# Patient Record
Sex: MALE | Race: WHITE | NOT HISPANIC OR LATINO | Employment: OTHER | URBAN - METROPOLITAN AREA
[De-identification: names, ages, dates, MRNs, and addresses within clinical notes are randomized per-mention and may not be internally consistent; named-entity substitution may affect disease eponyms.]

---

## 2020-09-22 LAB — NONINV COLON CA DNA+OCC BLD SCRN STL QL: POSITIVE

## 2022-08-16 DIAGNOSIS — C68.9 UROTHELIAL CARCINOMA: Primary | ICD-10-CM

## 2022-08-23 ENCOUNTER — OFFICE VISIT (OUTPATIENT)
Dept: HEMATOLOGY/ONCOLOGY | Facility: CLINIC | Age: 77
End: 2022-08-23
Payer: MEDICARE

## 2022-08-23 VITALS
HEART RATE: 75 BPM | HEIGHT: 69 IN | SYSTOLIC BLOOD PRESSURE: 138 MMHG | BODY MASS INDEX: 28.34 KG/M2 | OXYGEN SATURATION: 98 % | WEIGHT: 191.31 LBS | RESPIRATION RATE: 18 BRPM | DIASTOLIC BLOOD PRESSURE: 63 MMHG

## 2022-08-23 DIAGNOSIS — C68.9 UROTHELIAL CARCINOMA: ICD-10-CM

## 2022-08-23 PROCEDURE — 99205 PR OFFICE/OUTPT VISIT, NEW, LEVL V, 60-74 MIN: ICD-10-PCS | Mod: ,,, | Performed by: STUDENT IN AN ORGANIZED HEALTH CARE EDUCATION/TRAINING PROGRAM

## 2022-08-23 PROCEDURE — 99205 OFFICE O/P NEW HI 60 MIN: CPT | Mod: ,,, | Performed by: STUDENT IN AN ORGANIZED HEALTH CARE EDUCATION/TRAINING PROGRAM

## 2022-08-23 RX ORDER — DICLOFENAC SODIUM 10 MG/G
GEL TOPICAL
COMMUNITY
Start: 2022-08-02 | End: 2023-12-26 | Stop reason: SDUPTHER

## 2022-08-23 RX ORDER — ATORVASTATIN CALCIUM 20 MG/1
20 TABLET, FILM COATED ORAL DAILY
COMMUNITY
Start: 2022-08-16 | End: 2023-08-17 | Stop reason: SDUPTHER

## 2022-08-23 RX ORDER — ASPIRIN 81 MG/1
81 TABLET ORAL DAILY
COMMUNITY

## 2022-08-23 RX ORDER — TAMSULOSIN HYDROCHLORIDE 0.4 MG/1
1 CAPSULE ORAL DAILY
COMMUNITY
Start: 2022-07-06 | End: 2023-07-06 | Stop reason: SDUPTHER

## 2022-08-23 RX ORDER — MULTIVITAMIN
1 TABLET ORAL DAILY
COMMUNITY

## 2022-08-23 RX ORDER — BENAZEPRIL HYDROCHLORIDE 40 MG/1
40 TABLET ORAL DAILY
COMMUNITY
Start: 2022-07-06 | End: 2023-07-06 | Stop reason: SDUPTHER

## 2022-08-23 RX ORDER — FINASTERIDE 5 MG/1
5 TABLET, FILM COATED ORAL DAILY
COMMUNITY
Start: 2022-08-01

## 2022-08-23 NOTE — PROGRESS NOTES
Referring provider Dr. Hensley  Reason for consult:  Urothelial carcinoma    HPI  Patient is a 76-year-old with history of hypertension and BPH who presented to the ER in May 2022 with symptoms of hematuria.  A CT at that point revealed an obstructing mass in the right renal pelvis for which he was seen by Dr. Hensley and underwent a diagnostic ureteroscopy, pathology from which revealed urothelial carcinoma.  Patient underwent a right ureteronephrectomy on 23rd July 2022.  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  Patient presented to clinic to establish care to discuss further disease management.      Today 08/23/2022, patient denies any acute concerns.  He denies any further episodes of hematuria.  He reports healing well from surgery without any significant side effects.  He reports a fair appetite denies any weight loss.  He denies any new lumps or bumps, fevers, chills, drenching night sweats.      Patient is a current smoker, 25 pack year smoking history, denies alcohol or recreational drug use.  He currently works part-time, [employer].  Denies any family history of malignancy.  Lives with his wife.  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  Margins free of tumor, lymphovascular invasion not seen.  No regional lymph nodes submitted.  PT3 pNX pGlow-grade    Imaging   May 2022:  CT abdomen pelvis with contrast:  The filling defect in the right renal pelvis is most suggestive of a mass with neoplasm a concern.  This lesion also produces relatively delay in contrast opacification of the right kidney indicating partial urinary obstruction.    Past Medical History:    Diagnosis Date    Bladder cancer     Enlarged prostate     Hyperlipidemia     Hypertension     Urothelial carcinoma of kidney, right        Past Surgical History:   Procedure Laterality Date    KNEE SURGERY      LAPAROSCOPIC ROBOT-ASSISTED SURGICAL REMOVAL OF KIDNEY USING DA JAZMIN XI  07/20/2022    SHOULDER SURGERY         Family History   Problem Relation Age of Onset    Heart disease Mother     Heart disease Father     Heart disease Sister        Social History     Socioeconomic History    Marital status:    Tobacco Use    Smoking status: Smoker, Current Status Unknown     Packs/day: 0.50     Types: Cigarettes    Smokeless tobacco: Never Used   Substance and Sexual Activity    Alcohol use: Not Currently       Current Outpatient Medications   Medication Sig Dispense Refill    aspirin (ECOTRIN) 81 MG EC tablet Take 81 mg by mouth once daily.      atorvastatin (LIPITOR) 20 MG tablet Take 20 mg by mouth once daily.      benazepriL (LOTENSIN) 40 MG tablet Take 40 mg by mouth once daily.      diclofenac sodium (VOLTAREN) 1 % Gel Apply topically.      finasteride (PROSCAR) 5 mg tablet Take 5 mg by mouth once daily.      multivitamin (THERAGRAN) per tablet Take 1 tablet by mouth once daily.      tamsulosin (FLOMAX) 0.4 mg Cap Take 1 capsule by mouth once daily.       No current facility-administered medications for this visit.       Review of patient's allergies indicates:  No Known Allergies    Review of systems  CONSTITUTIONAL: no fevers, no chills, no weight loss, no fatigue, no weakness  HEMATOLOGIC: no abnormal bleeding, no abnormal bruising, no drenching night sweats  ONCOLOGIC: no new masses or lumps  HEENT: no vision loss, no tinnitus or hearing loss, no nose bleeding, no dysphagia, no odynophagia  CVS: no chest pain, no palpitations, no dyspnea on exertion  RESP: no shortness of breath, no hemoptysis, no cough  GI: no nausea, no vomiting, no diarrhea, no constipation, no melena, no  hematochezia, no hematemesis, no abdominal pain, no increase in abdominal girth  : no dysuria, no hematuria, no hesitancy, no scrotal swelling, no discharge  INTEGUMENT: no rashes, no abnormal bruising, no nail pitting, no hyperpigmentation  NEURO: no falls, no memory loss, no paresthesias or dysesthesias, no urofecal incontinence or retention, no loss of strength on any extremity  MSK: no back pain, no new joint pain, no joint swelling  PSYCH: no suicidal or homicidal ideation, no depression, no insomnia, no anhedonia  ENDOCRINE: no heat or cold intolerance, no polyuria, no polydipsia        Physical exam   Vitals:    08/23/22 1313   BP: 138/63   Pulse: 75   Resp: 18     Physical Exam:  ECOG PS 0  GA: AAOx3, NAD  HEENT: NCAT, PERRLA, EOMI, good dentition, moist oral mucous membranes  LYMPH: no cervical, axillary or supraclavicular adenopathy  CVS: s1s2 RRR, no M/R/G  RESP: CTA b/l, no crackles, no wheezes or rhonchi  ABD: soft, NT, ND, BS+, no hepatosplenomegaly  EXT: no deformities, no pedal edema  SKIN: no rashes, warm and dry  NEURO: normal mentation, strength 5/5 on all 4 extremities, no sensory deficits    Assessment and plan       # Urothelial carcinoma, right, upper ureter, low-grade, pT3 pNxMX, at least stage III  Negative margins, negative for lymphovascular invasion.    Of note patient had a diagnosis of non-muscle invasive bladder cancer in the past treated with resection followed by intravesical therapy.  Discussed with patient the diagnosis, staging and treatment recommendations including adjuvant therapy in the setting of T3 disease.    Patient is a relatively fit, currently works part-time.  He does have mild hearing loss.  Denies any symptoms of neuropathy.    Discussed the recommendations to undergo adjuvant platinum-based chemotherapy given T3 disease after completion of staging workup.     Patient is hesitant to consider chemotherapy given his experience with his family members going through  it.    Discussed the plan to obtain scans to complete staging workup and discuss further treatment options at his next visit.    Will plan to follow-up in clinic in 2 weeks with a repeat CT chest abdomen pelvis with IV contrast, CBC and CMP to discuss further.      Plan   # CT chest abdomen pelvis with# IV contrast   # CBC and CMP in 2 weeks   # follow-up in 2 weeks to discuss above workup and further recommendations regarding adjuvant chemotherapy in the setting of T3 disease.      A total of  60 minutes were spent in review of records, interpretation of test, coordination of care, discussion and counseling with the patient.        Portions of the record may have been created with voice recognition software. Occasional wrong-word or sound-a-like substitutions may have occurred due to the inherent limitations of voice recognition software. Read the chart carefully and recognize, using context, where substitutions have occurred.

## 2022-08-25 ENCOUNTER — TELEPHONE (OUTPATIENT)
Dept: HEMATOLOGY/ONCOLOGY | Facility: CLINIC | Age: 77
End: 2022-08-25
Payer: MEDICARE

## 2022-08-25 NOTE — TELEPHONE ENCOUNTER
----- Message from Natacha Clements sent at 8/24/2022  2:15 PM CDT -----  Regarding: RE: Please Scheduled  CT CAP scheduled 09/06/22 @ [facility] [place] [facility] w/ 12:30 arrival (pt requested afternoon appt)  ##NPO 6 hrs prior &  prep-kit day before##    Appt confirmed w/ patient  ----- Message -----  From: Natalya Torres  Sent: 8/23/2022   1:52 PM CDT  To: Natacha Clements  Subject: Please Scheduled                                 Please schedule CT CAP w/ contrast @ [facility] [place] before RTC on 09/09/22  Please see orders    Thanks

## 2022-09-06 NOTE — PROGRESS NOTES
Referring provider Dr. Hensley  Reason for consult:  Urothelial carcinoma    HPI  Patient is a 76-year-old with history of hypertension and BPH who presented to the ER in May 2022 with symptoms of hematuria.  A CT at that point revealed an obstructing mass in the right renal pelvis for which he was seen by Dr. eHnsley and underwent a diagnostic ureteroscopy, pathology from which revealed urothelial carcinoma.  Patient underwent a right ureteronephrectomy on 23rd July 2022.  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  Patient presented to clinic to establish care to discuss further disease management.      Today 09/7/22, patient denies any acute concerns today.  He denies any blood in his urine or difficulty with urination.  He denies any symptoms of pain, nausea, vomiting, decreased appetite or weight loss.  He reports increasing his smoking frequency given his anxiety related to his recent scans.    Patient is a current smoker, 25 pack year smoking history, denies alcohol or recreational drug use.  He currently works part-time, [employer].  Denies any family history of malignancy.  Lives with his wife.  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  Margins free of tumor, lymphovascular invasion not seen.  No regional lymph nodes submitted.  PT3 pNX pGlow-grade    Imaging   May 2022:  CT abdomen pelvis with contrast:  The filling defect in the right renal pelvis is most suggestive of a mass with neoplasm a concern.  This lesion also produces relatively delay in contrast opacification of the right kidney indicating partial urinary obstruction.      09/06/2022 CT chest abdomen  pelvis with IV contrast:  No acute pulmonary disease, adenopathy or suspicious pulmonary nodules identified.  Is suspected cyst is visible in the right thyroid lobe, evaluation with dedicated ultrasound to be considered.  Status post right nephrectomy and apparent surgery involving the right lateral wall of the urinary bladder, no gross mucosal lesions visible in the bladder although there is relative wall thickening identified inferiorly and posteriorly.      Laboratory   09/02/2022:  Creatinine 1.44, albumin 4.0, alkaline phosphatase 98, total bili 0.7, AST 18, ALT 18, WBC count 9.06, hemoglobin 14.5, platelet count 173.  ANC 5.70.    Past Medical History:   Diagnosis Date    Bladder cancer     Enlarged prostate     Hyperlipidemia     Hypertension     Urothelial carcinoma of kidney, right        Past Surgical History:   Procedure Laterality Date    KNEE SURGERY      LAPAROSCOPIC ROBOT-ASSISTED SURGICAL REMOVAL OF KIDNEY USING DA JAZMIN XI  07/20/2022    SHOULDER SURGERY         Family History   Problem Relation Age of Onset    Heart disease Mother     Heart disease Father     Heart disease Sister        Social History     Socioeconomic History    Marital status:    Tobacco Use    Smoking status: Smoker, Current Status Unknown     Packs/day: 0.50     Types: Cigarettes    Smokeless tobacco: Never   Substance and Sexual Activity    Alcohol use: Not Currently       Current Outpatient Medications   Medication Sig Dispense Refill    aspirin (ECOTRIN) 81 MG EC tablet Take 81 mg by mouth once daily.      atorvastatin (LIPITOR) 20 MG tablet Take 20 mg by mouth once daily.      benazepriL (LOTENSIN) 40 MG tablet Take 40 mg by mouth once daily.      diclofenac sodium (VOLTAREN) 1 % Gel Apply topically.      finasteride (PROSCAR) 5 mg tablet Take 5 mg by mouth once daily.      multivitamin (THERAGRAN) per tablet Take 1 tablet by mouth once daily.      tamsulosin (FLOMAX) 0.4 mg Cap Take 1 capsule by mouth once daily.        No current facility-administered medications for this visit.       Review of patient's allergies indicates:  No Known Allergies    Review of systems  CONSTITUTIONAL: no fevers, no chills, no weight loss, no fatigue, no weakness  HEMATOLOGIC: no abnormal bleeding, no abnormal bruising, no drenching night sweats  ONCOLOGIC: no new masses or lumps  HEENT: no vision loss, no tinnitus or hearing loss, no nose bleeding, no dysphagia, no odynophagia  CVS: no chest pain, no palpitations, no dyspnea on exertion  RESP: no shortness of breath, no hemoptysis, no cough  GI: no nausea, no vomiting, no diarrhea, no constipation, no melena, no hematochezia, no hematemesis, no abdominal pain, no increase in abdominal girth  : no dysuria, no hematuria, no hesitancy, no scrotal swelling, no discharge  INTEGUMENT: no rashes, no abnormal bruising, no nail pitting, no hyperpigmentation  NEURO: no falls, no memory loss, no paresthesias or dysesthesias, no urofecal incontinence or retention, no loss of strength on any extremity  MSK: no back pain, no new joint pain, no joint swelling  PSYCH: no suicidal or homicidal ideation, no depression, no insomnia, no anhedonia  ENDOCRINE: no heat or cold intolerance, no polyuria, no polydipsia        Physical exam   Vitals:    09/07/22 1053   BP: 115/65   Pulse: 70   Resp: 18   Temp: 98.1 °F (36.7 °C)       Physical Exam:  ECOG PS 0  GA: AAOx3, NAD  HEENT: NCAT, PERRLA, EOMI, good dentition, moist oral mucous membranes  LYMPH: no cervical, axillary or supraclavicular adenopathy  CVS: s1s2 RRR, no M/R/G  RESP: CTA b/l, no crackles, no wheezes or rhonchi  ABD: soft, NT, ND, BS+, no hepatosplenomegaly, well-healed incisions from prior laparoscopic surgery.  EXT: no deformities, no pedal edema  SKIN: no rashes, warm and dry  NEURO: normal mentation, strength 5/5 on all 4 extremities, no sensory deficits    Assessment and plan       # Urothelial carcinoma, right, upper ureter, low-grade, pT3 pNxMX,  at least stage III  Negative margins, negative for lymphovascular invasion.    Of note patient had a diagnosis of non-muscle invasive bladder cancer in the past treated with resection followed by intravesical therapy.  Discussed with patient the diagnosis, staging and treatment recommendations including adjuvant therapy in the setting of T3 disease.    Patient is a relatively fit, currently works part-time.  He does have mild hearing loss.  Denies any symptoms of neuropathy.    Discussed the recommendations to undergo adjuvant platinum-based chemotherapy given T3 disease after completion of staging workup.     Patient is hesitant to consider chemotherapy given his experience with his family members going through it.    Noted repeat CT chest abdomen pelvis IV contrast on 09/06/2022 without evidence of metastatic disease   Recommend adjuvant chemotherapy with gem cis.  Discussed the category 2B recommendation for nivolumab in adjuvant setting.  Patient would like to think about his options prior to making a decision.  He will be calling our office and bring his no if he is decided to proceed with adjuvant treatment.  And the choice of treatment between cisplatin/gemcitabine versus nivolumab.    Plan   # patient will be calling our clinic after discussing further with his family regarding his decision to proceed with adjuvant treatments   # plan to follow-up in clinic in 1 month as a backup with repeat labs.      A total of  40 minutes were spent in review of records, interpretation of test, coordination of care, discussion and counseling with the patient.        Addendum   09/12/2022  3:55 PM  Patient called office to let us know that he decided to go forth with adjuvant immunotherapy with 1 year of nivolumab instead of the recommendations for platinum based chemotherapy.    Orders placed, for insurance authorization.  Will schedule him for chemo education prior to starting treatment.  Plan to follow-up in clinic with  cycle 2 day 1 to assess treatment tolerance.       Portions of the record may have been created with voice recognition software. Occasional wrong-word or sound-a-like substitutions may have occurred due to the inherent limitations of voice recognition software. Read the chart carefully and recognize, using context, where substitutions have occurred.

## 2022-09-07 ENCOUNTER — OFFICE VISIT (OUTPATIENT)
Dept: HEMATOLOGY/ONCOLOGY | Facility: CLINIC | Age: 77
End: 2022-09-07
Payer: MEDICARE

## 2022-09-07 VITALS
OXYGEN SATURATION: 97 % | RESPIRATION RATE: 18 BRPM | DIASTOLIC BLOOD PRESSURE: 65 MMHG | TEMPERATURE: 98 F | SYSTOLIC BLOOD PRESSURE: 115 MMHG | WEIGHT: 192.81 LBS | BODY MASS INDEX: 28.56 KG/M2 | HEIGHT: 69 IN | HEART RATE: 70 BPM

## 2022-09-07 DIAGNOSIS — C68.9 UROTHELIAL CARCINOMA: Primary | ICD-10-CM

## 2022-09-07 PROCEDURE — 99215 PR OFFICE/OUTPT VISIT, EST, LEVL V, 40-54 MIN: ICD-10-PCS | Mod: ,,, | Performed by: STUDENT IN AN ORGANIZED HEALTH CARE EDUCATION/TRAINING PROGRAM

## 2022-09-07 PROCEDURE — 99215 OFFICE O/P EST HI 40 MIN: CPT | Mod: ,,, | Performed by: STUDENT IN AN ORGANIZED HEALTH CARE EDUCATION/TRAINING PROGRAM

## 2022-09-15 ENCOUNTER — OFFICE VISIT (OUTPATIENT)
Dept: HEMATOLOGY/ONCOLOGY | Facility: CLINIC | Age: 77
End: 2022-09-15
Payer: MEDICARE

## 2022-09-15 VITALS
DIASTOLIC BLOOD PRESSURE: 74 MMHG | SYSTOLIC BLOOD PRESSURE: 138 MMHG | TEMPERATURE: 98 F | HEIGHT: 69 IN | OXYGEN SATURATION: 99 % | BODY MASS INDEX: 28.81 KG/M2 | RESPIRATION RATE: 18 BRPM | WEIGHT: 194.5 LBS | HEART RATE: 61 BPM

## 2022-09-15 DIAGNOSIS — Z51.12 ENCOUNTER FOR ANTINEOPLASTIC IMMUNOTHERAPY: ICD-10-CM

## 2022-09-15 DIAGNOSIS — C68.9 UROTHELIAL CARCINOMA: Primary | ICD-10-CM

## 2022-09-15 PROCEDURE — 99214 PR OFFICE/OUTPT VISIT, EST, LEVL IV, 30-39 MIN: ICD-10-PCS | Mod: ,,, | Performed by: NURSE PRACTITIONER

## 2022-09-15 PROCEDURE — 99214 OFFICE O/P EST MOD 30 MIN: CPT | Mod: ,,, | Performed by: NURSE PRACTITIONER

## 2022-09-15 RX ORDER — ONDANSETRON HYDROCHLORIDE 8 MG/1
8 TABLET, FILM COATED ORAL EVERY 8 HOURS PRN
Qty: 30 TABLET | Refills: 2 | Status: SHIPPED | OUTPATIENT
Start: 2022-09-15 | End: 2022-11-17

## 2022-09-15 NOTE — PROGRESS NOTES
"Referring provider Dr. Hensley  Reason for consult:  Urothelial carcinoma    HPI  Patient is a 76-year-old with history of hypertension and BPH who presented to the ER in May 2022 with symptoms of hematuria.  A CT at that point revealed an obstructing mass in the right renal pelvis for which he was seen by Dr. Hensley and underwent a diagnostic ureteroscopy, pathology from which revealed urothelial carcinoma.  Patient underwent a right ureteronephrectomy on 23rd July 2022.  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  Patient presented to clinic to establish care to discuss further disease management.      Today 09/15/2022: Patient presents to clinic today for chemotherapy education. Treatment of Urothelial carcinoma, right, upper ureter, low-grade, pT3 pNxMX, at least stage III. Adjuvant immunotherapy with 1 year of nivolumab.   He is feeling "very good". He has no complaints, denies any changes from last visit.     Patient is a current smoker, 25 pack year smoking history, denies alcohol or recreational drug use.  He currently works part-time, [employer].  Denies any family history of malignancy.  Lives with his wife.  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  Margins free of tumor, lymphovascular invasion not seen.  No regional lymph nodes submitted.  PT3 pNX pGlow-grade    Imaging   May 2022:  CT abdomen pelvis with contrast:  The filling defect in the right renal pelvis is most suggestive of a mass with neoplasm a concern.  This lesion also produces relatively delay in contrast opacification of the right kidney indicating partial urinary obstruction.    09/06/2022 CT " chest abdomen pelvis with IV contrast:  No acute pulmonary disease, adenopathy or suspicious pulmonary nodules identified.  Is suspected cyst is visible in the right thyroid lobe, evaluation with dedicated ultrasound to be considered.  Status post right nephrectomy and apparent surgery involving the right lateral wall of the urinary bladder, no gross mucosal lesions visible in the bladder although there is relative wall thickening identified inferiorly and posteriorly.    Laboratory   09/02/2022:  Creatinine 1.44, albumin 4.0, alkaline phosphatase 98, total bili 0.7, AST 18, ALT 18, WBC count 9.06, hemoglobin 14.5, platelet count 173.  ANC 5.70.    Past Medical History:   Diagnosis Date    Bladder cancer     Enlarged prostate     Hyperlipidemia     Hypertension     Urothelial carcinoma of kidney, right        Past Surgical History:   Procedure Laterality Date    KNEE SURGERY      LAPAROSCOPIC ROBOT-ASSISTED SURGICAL REMOVAL OF KIDNEY USING DA JAZMIN XI  07/20/2022    SHOULDER SURGERY         Family History   Problem Relation Age of Onset    Heart disease Mother     Heart disease Father     Heart disease Sister        Social History     Socioeconomic History    Marital status:    Tobacco Use    Smoking status: Smoker, Current Status Unknown     Packs/day: 0.50     Types: Cigarettes    Smokeless tobacco: Never   Substance and Sexual Activity    Alcohol use: Not Currently       Current Outpatient Medications   Medication Sig Dispense Refill    aspirin (ECOTRIN) 81 MG EC tablet Take 81 mg by mouth once daily.      atorvastatin (LIPITOR) 20 MG tablet Take 20 mg by mouth once daily.      benazepriL (LOTENSIN) 40 MG tablet Take 40 mg by mouth once daily.      diclofenac sodium (VOLTAREN) 1 % Gel Apply topically.      finasteride (PROSCAR) 5 mg tablet Take 5 mg by mouth once daily.      multivitamin (THERAGRAN) per tablet Take 1 tablet by mouth once daily.      tamsulosin (FLOMAX) 0.4 mg Cap Take 1 capsule by mouth  "once daily.       No current facility-administered medications for this visit.       Review of patient's allergies indicates:  No Known Allergies    Review of systems  CONSTITUTIONAL: no fevers, no chills, no weight loss, no fatigue, no weakness  HEMATOLOGIC: no abnormal bleeding, no abnormal bruising, no drenching night sweats  ONCOLOGIC: no new masses or lumps  HEENT: no vision loss, no tinnitus or hearing loss, no nose bleeding, no dysphagia, no odynophagia  CVS: no chest pain, no palpitations, no dyspnea on exertion  RESP: + intermittent smoker's cough. Very little SOB w/ activity. Improves with rest. Coung/sob not new and unchanged. Cough occurs 1-2 times per day. Clear phlegm. no hemoptysis.   GI: no nausea, no vomiting, no diarrhea, no constipation, no melena, no hematochezia, no hematemesis, no abdominal pain, no increase in abdominal girth  : no dysuria, no hematuria, no hesitancy, no scrotal swelling, no discharge  INTEGUMENT: no rashes, no abnormal bruising, no nail pitting, no hyperpigmentation  NEURO: no falls, no memory loss, no paresthesias or dysesthesias, no urofecal incontinence or retention, no loss of strength on any extremity  MSK: Chronic low back pain, since early 30s. Hand pain, intermittent, fingers(mainly pinkys and right index(due to history of Dupertyn's contracture), no new joint pain, no joint swelling  PSYCH: no suicidal or homicidal ideation, no depression, no insomnia, no anhedonia  ENDOCRINE: no heat or cold intolerance, no polyuria, no polydipsia    Physical Exam:  Vitals: Blood pressure 138/74, pulse 61, temperature 98 °F (36.7 °C), temperature source Oral, resp. rate 18, height 5' 9" (1.753 m), weight 88.2 kg (194 lb 8 oz), SpO2 99 %.   ECOG PS 0  GA: AAOx3, NAD  HEENT: Slight hearing deficit. Sclera anicteric.   RESP: No labored breathing.   EXT: Mild flexure contractures bilateral fifth digits.   SKIN: Couple faded ecchymotic lesions, right forearm.   NEURO: normal mentation. " Normal gait.     Assessment and plan:   # Urothelial carcinoma, right, upper ureter, low-grade, pT3 pNxMX, at least stage III  Negative margins, negative for lymphovascular invasion.    Of note patient had a diagnosis of non-muscle invasive bladder cancer in the past treated with resection followed by intravesical therapy.  Dr. Montana previously discussed with patient the diagnosis, staging and treatment recommendations including adjuvant therapy in the setting of T3 disease.    Patient is a relatively fit, currently works part-time.  He does have mild hearing loss.  Denies any symptoms of neuropathy.    Discussed the recommendations to undergo adjuvant platinum-based chemotherapy given T3 disease after completion of staging workup.     Patient is hesitant to consider chemotherapy given his experience with his family members going through it.    Noted repeat CT chest abdomen pelvis IV contrast on 09/06/2022 without evidence of metastatic disease   Recommend adjuvant chemotherapy with gem cis.  Discussed the category 2B recommendation for nivolumab in adjuvant setting.      2. Education for Immunotherapy. Nivolumab.   Discussed Goals of Therapy-Yes.    Discussed Immunotherapy Drug-Yes.    Discussed Immunotherapy Regimen-Yes.    Discussed potential short - and long-term adverse Effects-Yes.  Special emphasis on Immune mediated reactions.   Discussed S/S to call MD/NP-Yes.    Written material for Nivolumab given to patient.   Ondansetron e-sent to patient's pharmacy.   Patient was given time to ask questions and express concerns. Questions answered-Yes.  Concerns addressed - Yes.   Verbalized Understanding-Yes.    Counseled::  Patient, Regarding diagnosis, Regarding treatment, Regarding medications, Regarding diet, Regarding activity, Verbalized understanding.       Total time spent on immunotherapy education and medical discussion: 30 minutes    Plan   # patient to return to clinic on 9/22/2022 with plans on starting  with Cycle # 1 Nivolumab.   # Labs to be done on 9/21/2022. CBC, CMP, TSH, Cortisol and magnesium.   # Next appointment will be on 10/20/2022 with Dr. Montana prior to receiving cycle # 2, to assess treatment tolerance. Labs prior.   # He is to call in the interim with any questions or concerns.       Tahira Strong, JOHNC

## 2022-09-22 DIAGNOSIS — E87.5 HYPERKALEMIA: Primary | ICD-10-CM

## 2022-09-22 RX ORDER — HEPARIN 100 UNIT/ML
500 SYRINGE INTRAVENOUS
Status: CANCELLED | OUTPATIENT
Start: 2022-09-22

## 2022-09-22 RX ORDER — SODIUM CHLORIDE 0.9 % (FLUSH) 0.9 %
10 SYRINGE (ML) INJECTION
Status: CANCELLED | OUTPATIENT
Start: 2022-09-22

## 2022-10-20 ENCOUNTER — OFFICE VISIT (OUTPATIENT)
Dept: HEMATOLOGY/ONCOLOGY | Facility: CLINIC | Age: 77
End: 2022-10-20
Payer: MEDICARE

## 2022-10-20 VITALS
SYSTOLIC BLOOD PRESSURE: 147 MMHG | DIASTOLIC BLOOD PRESSURE: 77 MMHG | RESPIRATION RATE: 18 BRPM | OXYGEN SATURATION: 98 % | HEART RATE: 79 BPM | BODY MASS INDEX: 28.53 KG/M2 | TEMPERATURE: 98 F | WEIGHT: 193.19 LBS

## 2022-10-20 DIAGNOSIS — C68.9 UROTHELIAL CARCINOMA: Primary | ICD-10-CM

## 2022-10-20 DIAGNOSIS — Z51.11 ENCOUNTER FOR ANTINEOPLASTIC CHEMOTHERAPY: ICD-10-CM

## 2022-10-20 PROCEDURE — 99215 OFFICE O/P EST HI 40 MIN: CPT | Mod: ,,, | Performed by: STUDENT IN AN ORGANIZED HEALTH CARE EDUCATION/TRAINING PROGRAM

## 2022-10-20 PROCEDURE — 99215 PR OFFICE/OUTPT VISIT, EST, LEVL V, 40-54 MIN: ICD-10-PCS | Mod: ,,, | Performed by: STUDENT IN AN ORGANIZED HEALTH CARE EDUCATION/TRAINING PROGRAM

## 2022-10-20 RX ORDER — SODIUM CHLORIDE 0.9 % (FLUSH) 0.9 %
10 SYRINGE (ML) INJECTION
Status: CANCELLED | OUTPATIENT
Start: 2022-10-20

## 2022-10-20 RX ORDER — HEPARIN 100 UNIT/ML
500 SYRINGE INTRAVENOUS
Status: CANCELLED | OUTPATIENT
Start: 2022-10-20

## 2022-10-20 NOTE — PROGRESS NOTES
Referring provider Dr. Hensley  Reason for consult:  Urothelial carcinoma      Current treatment   09/21/2022-current: Nivolumab    HPI  Patient is a 76-year-old with history of hypertension and BPH who presented to the ER in May 2022 with symptoms of hematuria.  A CT at that point revealed an obstructing mass in the right renal pelvis for which he was seen by Dr. Hensley and underwent a diagnostic ureteroscopy, pathology from which revealed urothelial carcinoma.  Patient underwent a right ureteronephrectomy on 23rd July 2022.  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  Patient presented to clinic to establish care to discuss further disease management.      Today, 10/20/2022, patient denies any acute concerns today.  He reports mild fatigue and intermittent nausea.  He reports a good appetite denies any weight loss.  He denies any new foci of pain.  Denies any hematuria or urinary urgency, increased frequency or burning.  Reports tolerating his last cycle of treatment fairly well    Patient is a current smoker, 25 pack year smoking history, denies alcohol or recreational drug use.  He currently works part-time, [employer].  Denies any family history of malignancy.  Lives with his wife.  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  Margins free of tumor, lymphovascular invasion not seen.  No regional lymph nodes submitted.  PT3 pNX pGlow-grade    Imaging   May 2022:  CT abdomen pelvis with contrast:  The filling defect in the right renal pelvis is most suggestive of a mass with neoplasm a concern.  This lesion also produces relatively delay in contrast opacification of the  right kidney indicating partial urinary obstruction.      09/06/2022 CT chest abdomen pelvis with IV contrast:  No acute pulmonary disease, adenopathy or suspicious pulmonary nodules identified.  Is suspected cyst is visible in the right thyroid lobe, evaluation with dedicated ultrasound to be considered.  Status post right nephrectomy and apparent surgery involving the right lateral wall of the urinary bladder, no gross mucosal lesions visible in the bladder although there is relative wall thickening identified inferiorly and posteriorly.      Laboratory   10/19/2022:  Creatinine 1.64, albumin 3.8, alkaline phosphatase 110, LFTs within normal limits, TSH 0.039, cortisol 4.9, WBC count 7.6, hemoglobin 13.6, .3, platelet count 191 000, ANC 4.75.    09/02/2022:  Creatinine 1.44, albumin 4.0, alkaline phosphatase 98, total bili 0.7, AST 18, ALT 18, WBC count 9.06, hemoglobin 14.5, platelet count 173.  ANC 5.70.    Past Medical History:   Diagnosis Date    Bladder cancer     Enlarged prostate     Hyperlipidemia     Hypertension     Urothelial carcinoma of kidney, right        Past Surgical History:   Procedure Laterality Date    KNEE SURGERY      LAPAROSCOPIC ROBOT-ASSISTED SURGICAL REMOVAL OF KIDNEY USING DA JAZMIN XI  07/20/2022    SHOULDER SURGERY         Family History   Problem Relation Age of Onset    Heart disease Mother     Heart disease Father     Heart disease Sister        Social History     Socioeconomic History    Marital status:    Tobacco Use    Smoking status: Smoker, Current Status Unknown     Packs/day: 0.50     Types: Cigarettes    Smokeless tobacco: Never   Substance and Sexual Activity    Alcohol use: Not Currently    Drug use: Never       Current Outpatient Medications   Medication Sig Dispense Refill    aspirin (ECOTRIN) 81 MG EC tablet Take 81 mg by mouth once daily.      atorvastatin (LIPITOR) 20 MG tablet Take 20 mg by mouth once daily.      benazepriL (LOTENSIN) 40 MG tablet Take  40 mg by mouth once daily.      diclofenac sodium (VOLTAREN) 1 % Gel Apply topically.      finasteride (PROSCAR) 5 mg tablet Take 5 mg by mouth once daily.      multivitamin (THERAGRAN) per tablet Take 1 tablet by mouth once daily.      ondansetron (ZOFRAN) 8 MG tablet Take 1 tablet (8 mg total) by mouth every 8 (eight) hours as needed for Nausea. 30 tablet 2    tamsulosin (FLOMAX) 0.4 mg Cap Take 1 capsule by mouth once daily.       No current facility-administered medications for this visit.       Review of patient's allergies indicates:  No Known Allergies    Review of systems  CONSTITUTIONAL: no fevers, no chills, no weight loss, no fatigue, no weakness  HEMATOLOGIC: no abnormal bleeding, no abnormal bruising, no drenching night sweats  ONCOLOGIC: no new masses or lumps  HEENT: no vision loss, no tinnitus or hearing loss, no nose bleeding, no dysphagia, no odynophagia  CVS: no chest pain, no palpitations, no dyspnea on exertion  RESP: no shortness of breath, no hemoptysis, no cough  GI: no nausea, no vomiting, no diarrhea, no constipation, no melena, no hematochezia, no hematemesis, no abdominal pain, no increase in abdominal girth  : no dysuria, no hematuria, no hesitancy, no scrotal swelling, no discharge  INTEGUMENT: no rashes, no abnormal bruising, no nail pitting, no hyperpigmentation  NEURO: no falls, no memory loss, no paresthesias or dysesthesias, no urofecal incontinence or retention, no loss of strength on any extremity  MSK: no back pain, no new joint pain, no joint swelling  PSYCH: no suicidal or homicidal ideation, no depression, no insomnia, no anhedonia  ENDOCRINE: no heat or cold intolerance, no polyuria, no polydipsia        Physical exam   Vitals:    10/20/22 1325   BP: (!) 147/77   Pulse: 79   Resp: 18   Temp: 98.4 °F (36.9 °C)         Physical Exam:  ECOG PS 0  GA: AAOx3, NAD  HEENT: NCAT, PERRLA, EOMI, good dentition, moist oral mucous membranes  LYMPH: no cervical, axillary or  supraclavicular adenopathy  CVS: s1s2 RRR, no M/R/G  RESP: CTA b/l, no crackles, no wheezes or rhonchi  ABD: soft, NT, ND, BS+, no hepatosplenomegaly, well-healed incisions from prior laparoscopic surgery.  EXT: no deformities, no pedal edema  SKIN: no rashes, warm and dry  NEURO: normal mentation, strength 5/5 on all 4 extremities, no sensory deficits    Assessment and plan       # Urothelial carcinoma, right, upper ureter, low-grade, pT3 pNxMX, at least stage III  Negative margins, negative for lymphovascular invasion.    Of note patient had a diagnosis of non-muscle invasive bladder cancer in the past treated with resection followed by intravesical therapy.  Discussed with patient the diagnosis, staging and treatment recommendations including adjuvant therapy in the setting of T3 disease.    Patient is a relatively fit, currently works part-time.  He does have mild hearing loss.  Denies any symptoms of neuropathy.    Discussed the recommendations to undergo adjuvant platinum-based chemotherapy given T3 disease after completion of staging workup.     Patient is hesitant to consider chemotherapy given his experience with his family members going through it.    Noted repeat CT chest abdomen pelvis IV contrast on 09/06/2022 without evidence of metastatic disease   Recommend adjuvant chemotherapy with gem cis.  Discussed the category 2B recommendation for nivolumab in adjuvant setting.  Patient would like to think about his options prior to making a decision.   He called our office to let us know that he would like to proceed with adjuvant nivolumab.    Initiated adjuvant nivolumab on 09/21/2022.    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  Patient is asymptomatic   Okay to proceed with next cycle of nivolumab today   Will obtain repeat blood work including a CBC, CMP, free T4, TSH, cortisol level prior to next cycle  Follow-up in clinic in 4 weeks prior to next cycle of  treatment          Portions of the record may have been created with voice recognition software. Occasional wrong-word or sound-a-like substitutions may have occurred due to the inherent limitations of voice recognition software. Read the chart carefully and recognize, using context, where substitutions have occurred.

## 2022-11-16 NOTE — PROGRESS NOTES
Referring provider Dr. Hensley  Reason for consult:  Urothelial carcinoma    Diagnosis:  - Urothelial ca pT3 pNxMX, at least stage III     Treatment History:  - s/p ureteronephrectomy on 23rd July 2022. Refused chemo.     Current Treatment:   -  Started with adj nivo on 09/21/2022.    Current treatment   09/21/2022-current: Nivolumab    HPI  Patient is a 76-year-old with history of hypertension and BPH who presented to the ER in May 2022 with symptoms of hematuria.  A CT at that point revealed an obstructing mass in the right renal pelvis for which he was seen by Dr. Hensley and underwent a diagnostic ureteroscopy, pathology from which revealed urothelial carcinoma.  Patient underwent a right ureteronephrectomy on 23rd July 2022.  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  Patient presented to clinic to establish care to discuss further disease management.      INTERVAL HISTORY:   patient denies any acute concerns today.  Denies any hematuria or urinary urgency, increased frequency or burning.  Reports tolerating his last cycle of treatment fairly well.  Denies any nausea, vomiting, diarrhea, constipation.  Denies feeling tired.    Patient is a current smoker, 25 pack year smoking history, denies alcohol or recreational drug use.  He currently works part-time, [employer].  Denies any family history of malignancy.  Lives with his wife.  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  Margins free of tumor, lymphovascular invasion not seen.  No regional lymph nodes submitted.  PT3 pNX pGlow-grade    Imaging   May 2022:  CT abdomen pelvis with contrast:  The filling defect in the  right renal pelvis is most suggestive of a mass with neoplasm a concern.  This lesion also produces relatively delay in contrast opacification of the right kidney indicating partial urinary obstruction.      09/06/2022 CT chest abdomen pelvis with IV contrast:  No acute pulmonary disease, adenopathy or suspicious pulmonary nodules identified.  Is suspected cyst is visible in the right thyroid lobe, evaluation with dedicated ultrasound to be considered.  Status post right nephrectomy and apparent surgery involving the right lateral wall of the urinary bladder, no gross mucosal lesions visible in the bladder although there is relative wall thickening identified inferiorly and posteriorly.      LAB:     11/16/2022:  Creatinine 1.6, albumin 3.8, calcium 9.5, alkaline phosphatase 100, total bili 0.4, AST 16, ALT 20, TSH 0.020, free T4 pending, WBC count 9.6, hemoglobin 13.7, .5, platelet count 202, ANC 6.36.    10/19/2022:  Creatinine 1.64, albumin 3.8, alkaline phosphatase 110, LFTs within normal limits, TSH 0.039, cortisol 4.9, WBC count 7.6, hemoglobin 13.6, .3, platelet count 191 000, ANC 4.75.    09/02/2022:  Creatinine 1.44, albumin 4.0, alkaline phosphatase 98, total bili 0.7, AST 18, ALT 18, WBC count 9.06, hemoglobin 14.5, platelet count 173.  ANC 5.70.    Past Medical History:   Diagnosis Date    Bladder cancer     Enlarged prostate     Hyperlipidemia     Hypertension     Urothelial carcinoma of kidney, right        Past Surgical History:   Procedure Laterality Date    KNEE SURGERY      LAPAROSCOPIC ROBOT-ASSISTED SURGICAL REMOVAL OF KIDNEY USING DA JAZMIN XI  07/20/2022    SHOULDER SURGERY         Family History   Problem Relation Age of Onset    Heart disease Mother     Heart disease Father     Heart disease Sister        Social History     Socioeconomic History    Marital status:    Tobacco Use    Smoking status: Smoker, Current Status Unknown     Packs/day: 0.50     Types: Cigarettes     Smokeless tobacco: Never   Substance and Sexual Activity    Alcohol use: Not Currently    Drug use: Never       Current Outpatient Medications   Medication Sig Dispense Refill    aspirin (ECOTRIN) 81 MG EC tablet Take 81 mg by mouth once daily.      atorvastatin (LIPITOR) 20 MG tablet Take 20 mg by mouth once daily.      benazepriL (LOTENSIN) 40 MG tablet Take 40 mg by mouth once daily.      diclofenac sodium (VOLTAREN) 1 % Gel Apply topically.      finasteride (PROSCAR) 5 mg tablet Take 5 mg by mouth once daily.      multivitamin (THERAGRAN) per tablet Take 1 tablet by mouth once daily.      tamsulosin (FLOMAX) 0.4 mg Cap Take 1 capsule by mouth once daily.       No current facility-administered medications for this visit.       Review of patient's allergies indicates:  No Known Allergies    Review of Systems   Constitutional: Negative.  Negative for chills, fever and malaise/fatigue.   HENT: Negative.     Eyes: Negative.    Respiratory: Negative.  Negative for cough and shortness of breath.    Cardiovascular: Negative.  Negative for chest pain.   Gastrointestinal: Negative.  Negative for abdominal pain, diarrhea, nausea and vomiting.   Genitourinary: Negative.    Musculoskeletal: Negative.    Skin: Negative.  Negative for itching and rash.   Neurological: Negative.  Negative for focal weakness.   Endo/Heme/Allergies: Negative.    Psychiatric/Behavioral: Negative.            Physical exam   Vitals:    11/17/22 1258   BP: 138/74   Pulse: 86   Resp: 18   Temp: 97.8 °F (36.6 °C)           Physical Exam  HENT:      Head: Normocephalic.   Cardiovascular:      Rate and Rhythm: Normal rate.   Pulmonary:      Effort: Pulmonary effort is normal.      Breath sounds: Normal breath sounds.   Abdominal:      Palpations: Abdomen is soft.   Musculoskeletal:      Cervical back: Neck supple.   Skin:     General: Skin is warm.   Neurological:      Mental Status: He is alert and oriented to person, place, and time.   Psychiatric:          Mood and Affect: Affect normal.              Assessment:       # Urothelial carcinoma, right, upper ureter, low-grade, pT3 pNxMX, at least stage III  Negative margins, negative for lymphovascular invasion.    Of note patient had a diagnosis of non-muscle invasive bladder cancer in the past treated with resection followed by intravesical therapy.  Discussed with patient the diagnosis, staging and treatment recommendations including adjuvant therapy in the setting of T3 disease.    Patient is a relatively fit, currently works part-time.  He does have mild hearing loss.  Denies any symptoms of neuropathy.    Discussed the recommendations to undergo adjuvant platinum-based chemotherapy given T3 disease after completion of staging workup.     Patient is hesitant to consider chemotherapy given his experience with his family members going through it.    Noted repeat CT chest abdomen pelvis IV contrast on 09/06/2022 without evidence of metastatic disease   Recommend adjuvant chemotherapy with gem cis.  Discussed the category 2B recommendation for nivolumab in adjuvant setting.  Patient would like to think about his options prior to making a decision.   He called our office to let us know that he would like to proceed with adjuvant nivolumab.    Initiated adjuvant nivolumab on 09/21/2022.    Plan:         # Reviewed labs, noted low TSH consistent , could be immune related and will refer to endocrinology for evaluation. Patient is asymptomatic   Okay to proceed with next cycle of nivolumab today   Will obtain repeat blood work including a CBC, CMP, free T4, TSH, cortisol level prior to next cycle  - MD / LABS / TREATMENT VISIT - 4 WEEKS     The patient was seen, interviewed and examined. Pertinent lab and radiology studies were reviewed. Pt instructed to call should develop concerning signs/symptoms or have further questions.       Portions of the record may have been created with voice recognition software. Occasional  wrong-word or sound-a-like substitutions may have occurred due to the inherent limitations of voice recognition software. Read the chart carefully and recognize, using context, where substitutions have occurred.    Liam Lindsay MD  Hematology / Oncology

## 2022-11-17 ENCOUNTER — OFFICE VISIT (OUTPATIENT)
Dept: HEMATOLOGY/ONCOLOGY | Facility: CLINIC | Age: 77
End: 2022-11-17
Payer: MEDICARE

## 2022-11-17 VITALS
WEIGHT: 193.38 LBS | SYSTOLIC BLOOD PRESSURE: 138 MMHG | HEART RATE: 86 BPM | DIASTOLIC BLOOD PRESSURE: 74 MMHG | TEMPERATURE: 98 F | BODY MASS INDEX: 28.56 KG/M2 | OXYGEN SATURATION: 96 % | RESPIRATION RATE: 18 BRPM

## 2022-11-17 DIAGNOSIS — Z51.11 ENCOUNTER FOR ANTINEOPLASTIC CHEMOTHERAPY: ICD-10-CM

## 2022-11-17 DIAGNOSIS — Z29.89 ENCOUNTER FOR IMMUNOTHERAPY: ICD-10-CM

## 2022-11-17 DIAGNOSIS — C68.9 UROTHELIAL CARCINOMA: Primary | ICD-10-CM

## 2022-11-17 PROCEDURE — 99215 PR OFFICE/OUTPT VISIT, EST, LEVL V, 40-54 MIN: ICD-10-PCS | Mod: ,,, | Performed by: INTERNAL MEDICINE

## 2022-11-17 PROCEDURE — 99215 OFFICE O/P EST HI 40 MIN: CPT | Mod: ,,, | Performed by: INTERNAL MEDICINE

## 2022-11-17 RX ORDER — SODIUM CHLORIDE 0.9 % (FLUSH) 0.9 %
10 SYRINGE (ML) INJECTION
Status: CANCELLED | OUTPATIENT
Start: 2022-11-17

## 2022-11-17 RX ORDER — HEPARIN 100 UNIT/ML
500 SYRINGE INTRAVENOUS
Status: CANCELLED | OUTPATIENT
Start: 2022-11-17

## 2022-12-15 ENCOUNTER — OFFICE VISIT (OUTPATIENT)
Dept: HEMATOLOGY/ONCOLOGY | Facility: CLINIC | Age: 77
End: 2022-12-15
Payer: MEDICARE

## 2022-12-15 VITALS
DIASTOLIC BLOOD PRESSURE: 70 MMHG | BODY MASS INDEX: 29.01 KG/M2 | RESPIRATION RATE: 18 BRPM | SYSTOLIC BLOOD PRESSURE: 129 MMHG | WEIGHT: 195.88 LBS | HEIGHT: 69 IN | HEART RATE: 69 BPM | TEMPERATURE: 98 F | OXYGEN SATURATION: 97 %

## 2022-12-15 DIAGNOSIS — C68.9 UROTHELIAL CARCINOMA: Primary | ICD-10-CM

## 2022-12-15 DIAGNOSIS — E03.2 HYPOTHYROIDISM DUE TO MEDICATION: ICD-10-CM

## 2022-12-15 PROCEDURE — 99215 OFFICE O/P EST HI 40 MIN: CPT | Mod: ,,, | Performed by: INTERNAL MEDICINE

## 2022-12-15 PROCEDURE — 99215 PR OFFICE/OUTPT VISIT, EST, LEVL V, 40-54 MIN: ICD-10-PCS | Mod: ,,, | Performed by: INTERNAL MEDICINE

## 2022-12-15 RX ORDER — HEPARIN 100 UNIT/ML
500 SYRINGE INTRAVENOUS
Status: CANCELLED | OUTPATIENT
Start: 2022-12-15

## 2022-12-15 RX ORDER — LEVOTHYROXINE SODIUM 25 UG/1
25 TABLET ORAL
Qty: 30 TABLET | Refills: 5 | Status: SHIPPED | OUTPATIENT
Start: 2022-12-15 | End: 2023-01-12

## 2022-12-15 RX ORDER — SODIUM CHLORIDE 0.9 % (FLUSH) 0.9 %
10 SYRINGE (ML) INJECTION
Status: CANCELLED | OUTPATIENT
Start: 2022-12-15

## 2022-12-15 NOTE — PROGRESS NOTES
Referring provider Dr. Hensley  Reason for consult:  Urothelial carcinoma    Diagnosis:  - Urothelial ca pT3 pNxMX, at least stage III     Treatment History:  - s/p ureteronephrectomy on 23rd July 2022. Refused chemo.     Current Treatment:   -  Started with adj nivo on 09/21/2022.          HPI  Patient is a 76-year-old with history of hypertension and BPH who presented to the ER in May 2022 with symptoms of hematuria.  A CT at that point revealed an obstructing mass in the right renal pelvis for which he was seen by Dr. Hensley and underwent a diagnostic ureteroscopy, pathology from which revealed urothelial carcinoma.  Patient underwent a right ureteronephrectomy on 23rd July 2022.  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  Patient presented to clinic to establish care to discuss further disease management.      INTERVAL HISTORY:   patient denies any acute concerns today.  Denies any hematuria or urinary urgency, increased frequency or burning.  Reports tolerating his last cycle of treatment fairly well.  Denies any nausea, vomiting, diarrhea, constipation.  Denies feeling tired.  Discussed the finding of hypothyroidism and starting on levothyroxine with the recommendations and referred for endocrinology evaluation      Patient is a current smoker, 25 pack year smoking history, denies alcohol or recreational drug use.  He currently works part-time, [employer].  Denies any family history of malignancy.  Lives with his wife.  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  Margins free of tumor, lymphovascular invasion not seen.  No regional lymph nodes submitted.  PT3 pNX  pGlow-grade    Imaging   May 2022:  CT abdomen pelvis with contrast:  The filling defect in the right renal pelvis is most suggestive of a mass with neoplasm a concern.  This lesion also produces relatively delay in contrast opacification of the right kidney indicating partial urinary obstruction.      09/06/2022 CT chest abdomen pelvis with IV contrast:  No acute pulmonary disease, adenopathy or suspicious pulmonary nodules identified.  Is suspected cyst is visible in the right thyroid lobe, evaluation with dedicated ultrasound to be considered.  Status post right nephrectomy and apparent surgery involving the right lateral wall of the urinary bladder, no gross mucosal lesions visible in the bladder although there is relative wall thickening identified inferiorly and posteriorly.      LAB:   12/14/2022-potassium 5.3, creatinine 1.74 WBC 10.05, hemoglobin 14.6, platelets 188, TSH 92.608, free T4 less than 0.42  11/16/2022:  Creatinine 1.6, albumin 3.8, calcium 9.5, alkaline phosphatase 100, total bili 0.4, AST 16, ALT 20, TSH 0.020, free T4 pending, WBC count 9.6, hemoglobin 13.7, .5, platelet count 202, ANC 6.36.    10/19/2022:  Creatinine 1.64, albumin 3.8, alkaline phosphatase 110, LFTs within normal limits, TSH 0.039, cortisol 4.9, WBC count 7.6, hemoglobin 13.6, .3, platelet count 191 000, ANC 4.75.    09/02/2022:  Creatinine 1.44, albumin 4.0, alkaline phosphatase 98, total bili 0.7, AST 18, ALT 18, WBC count 9.06, hemoglobin 14.5, platelet count 173.  ANC 5.70.    Past Medical History:   Diagnosis Date    Bladder cancer     Enlarged prostate     Hyperlipidemia     Hypertension     Urothelial carcinoma of kidney, right        Past Surgical History:   Procedure Laterality Date    KNEE SURGERY      LAPAROSCOPIC ROBOT-ASSISTED SURGICAL REMOVAL OF KIDNEY USING DA JAZMIN XI  07/20/2022    SHOULDER SURGERY         Family History   Problem Relation Age of Onset    Heart disease Mother     Heart disease  Father     Heart disease Sister        Social History     Socioeconomic History    Marital status:    Tobacco Use    Smoking status: Smoker, Current Status Unknown     Packs/day: 0.50     Types: Cigarettes    Smokeless tobacco: Never   Substance and Sexual Activity    Alcohol use: Not Currently    Drug use: Never       Current Outpatient Medications   Medication Sig Dispense Refill    aspirin (ECOTRIN) 81 MG EC tablet Take 81 mg by mouth once daily.      atorvastatin (LIPITOR) 20 MG tablet Take 20 mg by mouth once daily.      benazepriL (LOTENSIN) 40 MG tablet Take 40 mg by mouth once daily.      diclofenac sodium (VOLTAREN) 1 % Gel Apply topically.      finasteride (PROSCAR) 5 mg tablet Take 5 mg by mouth once daily.      multivitamin (THERAGRAN) per tablet Take 1 tablet by mouth once daily.      tamsulosin (FLOMAX) 0.4 mg Cap Take 1 capsule by mouth once daily.      levothyroxine (SYNTHROID) 25 MCG tablet Take 1 tablet (25 mcg total) by mouth before breakfast. 30 tablet 5     No current facility-administered medications for this visit.       Review of patient's allergies indicates:  No Known Allergies    Review of Systems   Constitutional: Negative.  Negative for chills, fever and malaise/fatigue.   HENT: Negative.     Eyes: Negative.    Respiratory: Negative.  Negative for cough and shortness of breath.    Cardiovascular: Negative.  Negative for chest pain.   Gastrointestinal: Negative.  Negative for abdominal pain, diarrhea, nausea and vomiting.   Genitourinary: Negative.    Musculoskeletal: Negative.    Skin: Negative.  Negative for itching and rash.   Neurological: Negative.  Negative for focal weakness.   Endo/Heme/Allergies: Negative.    Psychiatric/Behavioral: Negative.            Physical exam   Vitals:    12/15/22 1312   BP: 129/70   Pulse: 69   Resp: 18   Temp: 97.5 °F (36.4 °C)             Physical Exam  HENT:      Head: Normocephalic.   Cardiovascular:      Rate and Rhythm: Normal rate.    Pulmonary:      Effort: Pulmonary effort is normal.      Breath sounds: Normal breath sounds.   Abdominal:      Palpations: Abdomen is soft.   Musculoskeletal:      Cervical back: Neck supple.   Skin:     General: Skin is warm.   Neurological:      Mental Status: He is alert and oriented to person, place, and time.   Psychiatric:         Mood and Affect: Affect normal.              Assessment:       # Urothelial carcinoma, right, upper ureter, low-grade, pT3 pNxMX, at least stage III  Negative margins, negative for lymphovascular invasion.    Of note patient had a diagnosis of non-muscle invasive bladder cancer in the past treated with resection followed by intravesical therapy.  Discussed with patient the diagnosis, staging and treatment recommendations including adjuvant therapy in the setting of T3 disease.    Patient is a relatively fit, currently works part-time.  He does have mild hearing loss.  Denies any symptoms of neuropathy.    Discussed the recommendations to undergo adjuvant platinum-based chemotherapy given T3 disease after completion of staging workup.     Patient is hesitant to consider chemotherapy given his experience with his family members going through it.    Noted repeat CT chest abdomen pelvis IV contrast on 09/06/2022 without evidence of metastatic disease   Recommend adjuvant chemotherapy with gem cis.  Discussed the category 2B recommendation for nivolumab in adjuvant setting.  Patient would like to think about his options prior to making a decision.   He called our office to let us know that he would like to proceed with adjuvant nivolumab.    Initiated adjuvant nivolumab on 09/21/2022.    THYROID ABNORMALITIES:   - noted very high TSH of 92.6 with free T4 of less than 0.42 while during previous visit was noted to have very low TSH of 0.020 (11/2022) & 0.039 (10/2022). Previously 2.83 (09/2022).  - this could be initially because of the thyroiditis after being started on autoimmune  disease and now developed hypothyroidism.  Thus will start the patient on low-dose of levothyroxine for now and referred to endocrinology for further evaluation  - cortisol 6.6 (11/2022)    Plan:     - Reviewed labs, noted very high TSH of 92.6 with free T4 of less than 0.42 while during previous visit was noted to have very low TSH.  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 Pertinent lab and radiology studies were reviewed. Pt instructed to call should develop concerning signs/symptoms or have further questions.       Portions of the record may have been created with voice recognition software. Occasional wrong-word or sound-a-like substitutions may have occurred due to the inherent limitations of voice recognition software. Read the chart carefully and recognize, using context, where substitutions have occurred.    Liam Lindsay MD  Hematology / Oncology

## 2023-01-12 ENCOUNTER — OFFICE VISIT (OUTPATIENT)
Dept: HEMATOLOGY/ONCOLOGY | Facility: CLINIC | Age: 78
End: 2023-01-12
Payer: MEDICARE

## 2023-01-12 VITALS
HEART RATE: 78 BPM | TEMPERATURE: 96 F | BODY MASS INDEX: 30.26 KG/M2 | HEIGHT: 69 IN | WEIGHT: 204.31 LBS | SYSTOLIC BLOOD PRESSURE: 149 MMHG | OXYGEN SATURATION: 96 % | DIASTOLIC BLOOD PRESSURE: 80 MMHG | RESPIRATION RATE: 20 BRPM

## 2023-01-12 DIAGNOSIS — C68.9 UROTHELIAL CARCINOMA: Primary | ICD-10-CM

## 2023-01-12 PROCEDURE — 99215 PR OFFICE/OUTPT VISIT, EST, LEVL V, 40-54 MIN: ICD-10-PCS | Mod: ,,,

## 2023-01-12 PROCEDURE — 99215 OFFICE O/P EST HI 40 MIN: CPT | Mod: ,,,

## 2023-01-12 RX ORDER — SODIUM CHLORIDE 0.9 % (FLUSH) 0.9 %
10 SYRINGE (ML) INJECTION
Status: CANCELLED | OUTPATIENT
Start: 2023-01-12

## 2023-01-12 RX ORDER — HEPARIN 100 UNIT/ML
500 SYRINGE INTRAVENOUS
Status: CANCELLED | OUTPATIENT
Start: 2023-01-12

## 2023-01-12 RX ORDER — LEVOTHYROXINE SODIUM 100 UG/1
100 TABLET ORAL DAILY
COMMUNITY
Start: 2023-01-05 | End: 2023-07-13

## 2023-01-12 NOTE — PROGRESS NOTES
Referring provider Dr. Hensley  Reason for consult:  Urothelial carcinoma    Diagnosis:  - Urothelial ca pT3 pNxMX, at least stage III     Treatment History:  - s/p ureteronephrectomy on 23rd July 2022. Refused chemo.     Current Treatment:   -  Started with adj nivo on 09/21/2022.          HPI  Patient is a 76-year-old with history of hypertension and BPH who presented to the ER in May 2022 with symptoms of hematuria.  A CT at that point revealed an obstructing mass in the right renal pelvis for which he was seen by Dr. Hensley and underwent a diagnostic ureteroscopy, pathology from which revealed urothelial carcinoma.  Patient underwent a right ureteronephrectomy on 23rd July 2022.  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  Patient presented to clinic to establish care to discuss further disease management.      INTERVAL HISTORY:   patient denies any acute concerns today.  Denies any hematuria or urinary urgency, increased frequency or burning.  Reports tolerating his last cycle of treatment fairly well.  Denies any nausea, vomiting, diarrhea, constipation.  Denies feeling tired. 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  He has a f/u with Dr. Aguilar in 6 weeks.    Patient is a current smoker, 25 pack year smoking history, denies alcohol or recreational drug use.  He currently works part-time, [employer].  Denies any family history of malignancy.  Lives with his wife.  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  Margins free of tumor, lymphovascular invasion not seen.  No regional  lymph nodes submitted.  PT3 pNX pGlow-grade    Imaging   May 2022:  CT abdomen pelvis with contrast:  The filling defect in the right renal pelvis is most suggestive of a mass with neoplasm a concern.  This lesion also produces relatively delay in contrast opacification of the right kidney indicating partial urinary obstruction.      09/06/2022 CT chest abdomen pelvis with IV contrast:  No acute pulmonary disease, adenopathy or suspicious pulmonary nodules identified.  Is suspected cyst is visible in the right thyroid lobe, evaluation with dedicated ultrasound to be considered.  Status post right nephrectomy and apparent surgery involving the right lateral wall of the urinary bladder, no gross mucosal lesions visible in the bladder although there is relative wall thickening identified inferiorly and posteriorly.      LAB:   01/11/23: Cr 1.70, K+ 5.23, mag 2.1, phosphorous 4.7, LFTs WNL, TSH 98.9, Free T4 0.53, wbc 10.84, hgb 14.6, plt 190, ANC 7.48  12/14/2022-potassium 5.3, creatinine 1.74 WBC 10.05, hemoglobin 14.6, platelets 188, TSH 92.608, free T4 less than 0.42  11/16/2022:  Creatinine 1.6, albumin 3.8, calcium 9.5, alkaline phosphatase 100, total bili 0.4, AST 16, ALT 20, TSH 0.020, free T4 pending, WBC count 9.6, hemoglobin 13.7, .5, platelet count 202, ANC 6.36.    10/19/2022:  Creatinine 1.64, albumin 3.8, alkaline phosphatase 110, LFTs within normal limits, TSH 0.039, cortisol 4.9, WBC count 7.6, hemoglobin 13.6, .3, platelet count 191 000, ANC 4.75.    09/02/2022:  Creatinine 1.44, albumin 4.0, alkaline phosphatase 98, total bili 0.7, AST 18, ALT 18, WBC count 9.06, hemoglobin 14.5, platelet count 173.  ANC 5.70.    Past Medical History:   Diagnosis Date    Bladder cancer     Enlarged prostate     Hyperlipidemia     Hypertension     Urothelial carcinoma of kidney, right        Past Surgical History:   Procedure Laterality Date    KNEE SURGERY      LAPAROSCOPIC ROBOT-ASSISTED SURGICAL  REMOVAL OF KIDNEY USING DA JAZMIN XI  07/20/2022    SHOULDER SURGERY         Family History   Problem Relation Age of Onset    Heart disease Mother     Heart disease Father     Heart disease Sister        Social History     Socioeconomic History    Marital status:    Tobacco Use    Smoking status: Smoker, Current Status Unknown     Packs/day: 0.50     Types: Cigarettes    Smokeless tobacco: Never   Substance and Sexual Activity    Alcohol use: Not Currently    Drug use: Never       Current Outpatient Medications   Medication Sig Dispense Refill    aspirin (ECOTRIN) 81 MG EC tablet Take 81 mg by mouth once daily.      atorvastatin (LIPITOR) 20 MG tablet Take 20 mg by mouth once daily.      benazepriL (LOTENSIN) 40 MG tablet Take 40 mg by mouth once daily.      diclofenac sodium (VOLTAREN) 1 % Gel Apply topically.      finasteride (PROSCAR) 5 mg tablet Take 5 mg by mouth once daily.      levothyroxine (SYNTHROID) 100 MCG tablet Take 100 mcg by mouth Daily.      multivitamin (THERAGRAN) per tablet Take 1 tablet by mouth once daily.      tamsulosin (FLOMAX) 0.4 mg Cap Take 1 capsule by mouth once daily.       No current facility-administered medications for this visit.       Review of patient's allergies indicates:  No Known Allergies    Review of Systems   Constitutional: Negative.  Negative for chills, fever and malaise/fatigue.   HENT: Negative.     Eyes: Negative.    Respiratory: Negative.  Negative for cough and shortness of breath.    Cardiovascular: Negative.  Negative for chest pain.   Gastrointestinal: Negative.  Negative for abdominal pain, diarrhea, nausea and vomiting.   Genitourinary: Negative.    Musculoskeletal: Negative.    Skin: Negative.  Negative for itching and rash.   Neurological: Negative.  Negative for focal weakness.   Endo/Heme/Allergies: Negative.    Psychiatric/Behavioral: Negative.            Physical exam   Vitals:    01/12/23 1431   BP: (!) 149/80   Pulse: 78   Resp: 20   Temp: 96 °F  (35.6 °C)             Physical Exam  HENT:      Head: Normocephalic.   Cardiovascular:      Rate and Rhythm: Normal rate.   Pulmonary:      Effort: Pulmonary effort is normal.      Breath sounds: Normal breath sounds.   Abdominal:      Palpations: Abdomen is soft.   Musculoskeletal:      Cervical back: Neck supple.   Skin:     General: Skin is warm.   Neurological:      Mental Status: He is alert and oriented to person, place, and time.   Psychiatric:         Mood and Affect: Affect normal.              Assessment:       # Urothelial carcinoma, right, upper ureter, low-grade, pT3 pNxMX, at least stage III  Negative margins, negative for lymphovascular invasion.    Of note patient had a diagnosis of non-muscle invasive bladder cancer in the past treated with resection followed by intravesical therapy.  Discussed with patient the diagnosis, staging and treatment recommendations including adjuvant therapy in the setting of T3 disease.    Patient is a relatively fit, currently works part-time.  He does have mild hearing loss.  Denies any symptoms of neuropathy.    Discussed the recommendations to undergo adjuvant platinum-based chemotherapy given T3 disease after completion of staging workup.     Patient is hesitant to consider chemotherapy given his experience with his family members going through it.    Noted repeat CT chest abdomen pelvis IV contrast on 09/06/2022 without evidence of metastatic disease   Recommend adjuvant chemotherapy with gem cis.  Discussed the category 2B recommendation for nivolumab in adjuvant setting.  Patient would like to think about his options prior to making a decision.   He called our office to let us know that he would like to proceed with adjuvant nivolumab.    Initiated adjuvant nivolumab on 09/21/2022.    THYROID ABNORMALITIES:   - noted very high TSH of 92.6 with free T4 of less than 0.42 while during previous visit was noted to have very low TSH of 0.020 (11/2022) & 0.039  (10/2022). Previously 2.83 (09/2022).  - this could be initially because of the thyroiditis after being started on autoimmune disease and now developed hypothyroidism.  Thus will start the patient on low-dose of levothyroxine for now and referred to endocrinology for further evaluation  - cortisol 6.6 (11/2022)  - Hypothyroidism currently being treated by Dr Aguilar, endocrinologist. He was started on levothyroxine 50 mcg X 1 week and will begin levothyroxine 100 mcg daily in a couple days. .     Plan:     - Reviewed labs and noted increase TSH from 92.6 to 98.9. He has been evaluated by endocrinology (Dr. gAuilar) and prescribed Levothyroxine 100 mcg. He was to start w/ 1/2 dose (50 mcg) X 1 week and then increase 100 mcg daily. Will continue to monitor   - Dr. Lindsay and Dr. Aguilar okay to proceed with next cycle of nivolumab today   - Continue to f/u with Dr. Aguilar for management of hypothyroidism and levothyroxine adjustments  - repeat blood work including a CBC, CMP, free T4, TSH, cortisol level prior to next cycle  - LABS / TREATMENT VISIT - 4 WEEKS         PETE Garcia

## 2023-02-09 ENCOUNTER — OFFICE VISIT (OUTPATIENT)
Dept: HEMATOLOGY/ONCOLOGY | Facility: CLINIC | Age: 78
End: 2023-02-09
Payer: MEDICARE

## 2023-02-09 VITALS
BODY MASS INDEX: 30.26 KG/M2 | WEIGHT: 204.31 LBS | TEMPERATURE: 99 F | HEART RATE: 75 BPM | HEIGHT: 69 IN | SYSTOLIC BLOOD PRESSURE: 148 MMHG | RESPIRATION RATE: 18 BRPM | OXYGEN SATURATION: 97 % | DIASTOLIC BLOOD PRESSURE: 73 MMHG

## 2023-02-09 DIAGNOSIS — C68.9 UROTHELIAL CARCINOMA: Primary | ICD-10-CM

## 2023-02-09 DIAGNOSIS — Z29.89 ENCOUNTER FOR IMMUNOTHERAPY: ICD-10-CM

## 2023-02-09 PROCEDURE — 99215 PR OFFICE/OUTPT VISIT, EST, LEVL V, 40-54 MIN: ICD-10-PCS | Mod: ,,, | Performed by: STUDENT IN AN ORGANIZED HEALTH CARE EDUCATION/TRAINING PROGRAM

## 2023-02-09 PROCEDURE — 99215 OFFICE O/P EST HI 40 MIN: CPT | Mod: ,,, | Performed by: STUDENT IN AN ORGANIZED HEALTH CARE EDUCATION/TRAINING PROGRAM

## 2023-02-09 RX ORDER — SODIUM CHLORIDE 0.9 % (FLUSH) 0.9 %
10 SYRINGE (ML) INJECTION
Status: CANCELLED | OUTPATIENT
Start: 2023-02-09

## 2023-02-09 RX ORDER — HEPARIN 100 UNIT/ML
500 SYRINGE INTRAVENOUS
Status: CANCELLED | OUTPATIENT
Start: 2023-02-09

## 2023-02-09 NOTE — PROGRESS NOTES
Referring provider Dr. Hensley  Reason for consult:  Urothelial carcinoma    Diagnosis:  - Urothelial ca pT3 pNxMX, at least stage III     Treatment History:  - s/p ureteronephrectomy on 23rd July 2022. Refused chemo.     Current Treatment:   -  Started with adj nivo on 09/21/2022.          HPI  Patient is a 76-year-old with history of hypertension and BPH who presented to the ER in May 2022 with symptoms of hematuria.  A CT at that point revealed an obstructing mass in the right renal pelvis for which he was seen by Dr. Hensley and underwent a diagnostic ureteroscopy, pathology from which revealed urothelial carcinoma.  Patient underwent a right ureteronephrectomy on 23rd July 2022.  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  Patient presented to clinic to establish care to discuss further disease management.      INTERVAL HISTORY:   Today, 02/09/2023, patient denies any acute concerns today.  He reports tolerating his last cycle of treatment well without any significant side effects.  Reports compliance with Synthroid as directed by his endocrinologist.  Denies any symptoms of new foci of pain, decreased appetite, weight loss, fatigue, constipation    Patient is a current smoker, 25 pack year smoking history, denies alcohol or recreational drug use.  He currently works part-time, [employer].  Denies any family history of malignancy.  Lives with his wife.  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  Margins free of tumor, lymphovascular invasion not seen.  No regional lymph nodes submitted.  PT3 pNX pGlow-grade    Imaging   May 2022:  CT abdomen pelvis with contrast:  The  filling defect in the right renal pelvis is most suggestive of a mass with neoplasm a concern.  This lesion also produces relatively delay in contrast opacification of the right kidney indicating partial urinary obstruction.      09/06/2022 CT chest abdomen pelvis with IV contrast:  No acute pulmonary disease, adenopathy or suspicious pulmonary nodules identified.  Is suspected cyst is visible in the right thyroid lobe, evaluation with dedicated ultrasound to be considered.  Status post right nephrectomy and apparent surgery involving the right lateral wall of the urinary bladder, no gross mucosal lesions visible in the bladder although there is relative wall thickening identified inferiorly and posteriorly.      LAB:   02/08/2023:  Creatinine 1.8, albumin 4.2, calcium 9.4, total bili 0.9, AST 18, ALT 15, TSH 25, cortisol 9.6, WBC 9.3, hemoglobin 14.8, .1, platelet count 192.  ANC 6.3.  Magnesium 2.1, phosphorus 3.3.  01/11/23: Cr 1.70, K+ 5.23, mag 2.1, phosphorous 4.7, LFTs WNL, TSH 98.9, Free T4 0.53, wbc 10.84, hgb 14.6, plt 190, ANC 7.48  12/14/2022-potassium 5.3, creatinine 1.74 WBC 10.05, hemoglobin 14.6, platelets 188, TSH 92.608, free T4 less than 0.42  11/16/2022:  Creatinine 1.6, albumin 3.8, calcium 9.5, alkaline phosphatase 100, total bili 0.4, AST 16, ALT 20, TSH 0.020, free T4 pending, WBC count 9.6, hemoglobin 13.7, .5, platelet count 202, ANC 6.36.    10/19/2022:  Creatinine 1.64, albumin 3.8, alkaline phosphatase 110, LFTs within normal limits, TSH 0.039, cortisol 4.9, WBC count 7.6, hemoglobin 13.6, .3, platelet count 191 000, ANC 4.75.    09/02/2022:  Creatinine 1.44, albumin 4.0, alkaline phosphatase 98, total bili 0.7, AST 18, ALT 18, WBC count 9.06, hemoglobin 14.5, platelet count 173.  ANC 5.70.    Past Medical History:   Diagnosis Date    Bladder cancer     Enlarged prostate     Hyperlipidemia     Hypertension     Urothelial carcinoma of kidney, right        Past  Surgical History:   Procedure Laterality Date    KNEE SURGERY      LAPAROSCOPIC ROBOT-ASSISTED SURGICAL REMOVAL OF KIDNEY USING DA JAZMIN XI  07/20/2022    SHOULDER SURGERY         Family History   Problem Relation Age of Onset    Heart disease Mother     Heart disease Father     Heart disease Sister        Social History     Socioeconomic History    Marital status:    Tobacco Use    Smoking status: Smoker, Current Status Unknown     Packs/day: 0.50     Types: Cigarettes    Smokeless tobacco: Never   Substance and Sexual Activity    Alcohol use: Not Currently    Drug use: Never       Current Outpatient Medications   Medication Sig Dispense Refill    aspirin (ECOTRIN) 81 MG EC tablet Take 81 mg by mouth once daily.      atorvastatin (LIPITOR) 20 MG tablet Take 20 mg by mouth once daily.      benazepriL (LOTENSIN) 40 MG tablet Take 40 mg by mouth once daily.      diclofenac sodium (VOLTAREN) 1 % Gel Apply topically.      finasteride (PROSCAR) 5 mg tablet Take 5 mg by mouth once daily.      levothyroxine (SYNTHROID) 100 MCG tablet Take 100 mcg by mouth Daily.      multivitamin (THERAGRAN) per tablet Take 1 tablet by mouth once daily.      tamsulosin (FLOMAX) 0.4 mg Cap Take 1 capsule by mouth once daily.       No current facility-administered medications for this visit.       Review of patient's allergies indicates:  No Known Allergies    Review of Systems   Constitutional: Negative.  Negative for chills, fever and malaise/fatigue.   HENT: Negative.     Eyes: Negative.    Respiratory: Negative.  Negative for cough and shortness of breath.    Cardiovascular: Negative.  Negative for chest pain.   Gastrointestinal: Negative.  Negative for abdominal pain, diarrhea, nausea and vomiting.   Genitourinary: Negative.    Musculoskeletal: Negative.    Skin: Negative.  Negative for itching and rash.   Neurological: Negative.  Negative for focal weakness.   Endo/Heme/Allergies: Negative.    Psychiatric/Behavioral: Negative.             Physical exam   Vitals:    02/09/23 1303   BP: (!) 148/73   Pulse: 75   Resp: 18   Temp: 98.5 °F (36.9 °C)               Physical Exam  HENT:      Head: Normocephalic.   Cardiovascular:      Rate and Rhythm: Normal rate.   Pulmonary:      Effort: Pulmonary effort is normal.      Breath sounds: Normal breath sounds.   Abdominal:      Palpations: Abdomen is soft.   Musculoskeletal:      Cervical back: Neck supple.   Skin:     General: Skin is warm.   Neurological:      Mental Status: He is alert and oriented to person, place, and time.   Psychiatric:         Mood and Affect: Affect normal.              Assessment:       # Urothelial carcinoma, right, upper ureter, low-grade, pT3 pNxMX, at least stage III  Negative margins, negative for lymphovascular invasion.    Of note patient had a diagnosis of non-muscle invasive bladder cancer in the past treated with resection followed by intravesical therapy.  Discussed with patient the diagnosis, staging and treatment recommendations including adjuvant therapy in the setting of T3 disease.    Patient is a relatively fit, currently works part-time.  He does have mild hearing loss.  Denies any symptoms of neuropathy.    Discussed the recommendations to undergo adjuvant platinum-based chemotherapy given T3 disease after completion of staging workup.     Patient is hesitant to consider chemotherapy given his experience with his family members going through it.    Noted repeat CT chest abdomen pelvis IV contrast on 09/06/2022 without evidence of metastatic disease   Recommend adjuvant chemotherapy with gem cis.  Discussed the category 2B recommendation for nivolumab in adjuvant setting.  Patient would like to think about his options prior to making a decision.   He called our office to let us know that he would like to proceed with adjuvant nivolumab.    Initiated adjuvant nivolumab on 09/21/2022.    #Hypothyroidism  - noted very high TSH of 92.6 with free T4 of less than  0.42 while during previous visit was noted to have very low TSH of 0.020 (11/2022) & 0.039 (10/2022). Previously 2.83 (09/2022).  - this could be initially because of the thyroiditis after being started on autoimmune disease and now developed hypothyroidism.  Thus will start the patient on low-dose of levothyroxine for now and referred to endocrinology for further evaluation  - cortisol 6.6 (11/2022)  - Hypothyroidism currently being treated by Dr Aguilar, endocrinologist. He was started on levothyroxine 50 mcg X 1 week and will begin levothyroxine 100 mcg daily in a couple days. .     Plan:     Reviewed labs, okay to Proceed with q.4 weeks of nivolumab  Continue levothyroxine as directed by Dr. Aguilar  Plan to follow-up in clinic in 4 weeks with repeat labs prior to next cycle of nivolumab.  Plan is for total 1 year of adjuvant nivolumab.      Portions of the record may have been created with voice recognition software. Occasional wrong-word or sound-a-like substitutions may have occurred due to the inherent limitations of voice recognition software. Read the chart carefully and recognize, using context, where substitutions have occurred.

## 2023-03-09 ENCOUNTER — OFFICE VISIT (OUTPATIENT)
Dept: HEMATOLOGY/ONCOLOGY | Facility: CLINIC | Age: 78
End: 2023-03-09
Payer: MEDICARE

## 2023-03-09 VITALS
TEMPERATURE: 98 F | BODY MASS INDEX: 29.77 KG/M2 | OXYGEN SATURATION: 96 % | HEART RATE: 89 BPM | RESPIRATION RATE: 20 BRPM | HEIGHT: 69 IN | SYSTOLIC BLOOD PRESSURE: 124 MMHG | WEIGHT: 201 LBS | DIASTOLIC BLOOD PRESSURE: 74 MMHG

## 2023-03-09 DIAGNOSIS — C68.9 UROTHELIAL CARCINOMA: Primary | ICD-10-CM

## 2023-03-09 PROCEDURE — 99214 PR OFFICE/OUTPT VISIT, EST, LEVL IV, 30-39 MIN: ICD-10-PCS | Mod: ,,,

## 2023-03-09 PROCEDURE — 99214 OFFICE O/P EST MOD 30 MIN: CPT | Mod: ,,,

## 2023-03-09 RX ORDER — LEVOTHYROXINE, LIOTHYRONINE 38; 9 UG/1; UG/1
60 TABLET ORAL DAILY
COMMUNITY
Start: 2023-03-02 | End: 2023-07-13 | Stop reason: SDUPTHER

## 2023-03-09 NOTE — PROGRESS NOTES
Referring provider Dr. Hensley  Reason for consult:  Urothelial carcinoma    Diagnosis:  - Urothelial ca pT3 pNxMX, at least stage III     Treatment History:  - s/p ureteronephrectomy on 23rd July 2022. Refused chemo.     Current Treatment:   -  Started with adj nivo on 09/21/2022.          HPI  Patient is a 76-year-old with history of hypertension and BPH who presented to the ER in May 2022 with symptoms of hematuria.  A CT at that point revealed an obstructing mass in the right renal pelvis for which he was seen by Dr. Hensley and underwent a diagnostic ureteroscopy, pathology from which revealed urothelial carcinoma.  Patient underwent a right ureteronephrectomy on 23rd July 2022.  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  Patient presented to clinic to establish care to discuss further disease management.      INTERVAL HISTORY:   Today, 03/9/2023, patient denies any acute concerns today.  He reports tolerating his last cycle of treatment well without any significant side effects. Patient has stopped taking thyroid medication as it is causing severe stomach upset. He has reached out to Dr. Pierre's office a couple of times but did not have an appt. TSH is now 3 x that from previous visit and he has not taken any thyroid medication in over a week.  Patient states he declines to continue with nivolumab. Denies any symptoms of new foci of pain, decreased appetite, weight loss, fatigue, constipation    Patient is a current smoker, 25 pack year smoking history, denies alcohol or recreational drug use.  He currently works part-time, [employer].  Denies any family history of malignancy.  Lives with his wife.  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  Margins free of tumor, lymphovascular invasion not seen.  No regional lymph nodes submitted.  PT3 pNX pGlow-grade    Imaging   May 2022:  CT abdomen pelvis with contrast:  The filling defect in the right renal pelvis is most suggestive of a mass with neoplasm a concern.  This lesion also produces relatively delay in contrast opacification of the right kidney indicating partial urinary obstruction.      09/06/2022 CT chest abdomen pelvis with IV contrast:  No acute pulmonary disease, adenopathy or suspicious pulmonary nodules identified.  Is suspected cyst is visible in the right thyroid lobe, evaluation with dedicated ultrasound to be considered.  Status post right nephrectomy and apparent surgery involving the right lateral wall of the urinary bladder, no gross mucosal lesions visible in the bladder although there is relative wall thickening identified inferiorly and posteriorly.      LAB:   03.08/23: Cr 1.93, ca 9.52, alb 4.1, LFTs WNL, cortisol 8.6, TSH 79.73 T4 .50, wbc 9.08, hgb 14.9, plt 185, ANC 6.04  02/08/2023:  Creatinine 1.8, albumin 4.2, calcium 9.4, total bili 0.9, AST 18, ALT 15, TSH 25, cortisol 9.6, WBC 9.3, hemoglobin 14.8, .1, platelet count 192.  ANC 6.3.  Magnesium 2.1, phosphorus 3.3.  01/11/23: Cr 1.70, K+ 5.23, mag 2.1, phosphorous 4.7, LFTs WNL, TSH 98.9, Free T4 0.53, wbc 10.84, hgb 14.6, plt 190, ANC 7.48  12/14/2022-potassium 5.3, creatinine 1.74 WBC 10.05, hemoglobin 14.6, platelets 188, TSH 92.608, free T4 less than 0.42  11/16/2022:  Creatinine 1.6, albumin 3.8, calcium 9.5, alkaline phosphatase 100, total bili 0.4, AST 16, ALT 20, TSH 0.020, free T4 pending, WBC count 9.6, hemoglobin 13.7, .5, platelet count 202, ANC 6.36.    10/19/2022:  Creatinine 1.64, albumin 3.8, alkaline phosphatase 110, LFTs within normal limits, TSH 0.039, cortisol 4.9, WBC count 7.6, hemoglobin 13.6, .3,  platelet count 191 000, ANC 4.75.    09/02/2022:  Creatinine 1.44, albumin 4.0, alkaline phosphatase 98, total bili 0.7, AST 18, ALT 18, WBC count 9.06, hemoglobin 14.5, platelet count 173.  ANC 5.70.    Past Medical History:   Diagnosis Date    Bladder cancer     Enlarged prostate     Hyperlipidemia     Hypertension     Urothelial carcinoma of kidney, right        Past Surgical History:   Procedure Laterality Date    KNEE SURGERY      LAPAROSCOPIC ROBOT-ASSISTED SURGICAL REMOVAL OF KIDNEY USING DA JAZMIN XI  07/20/2022    SHOULDER SURGERY         Family History   Problem Relation Age of Onset    Heart disease Mother     Heart disease Father     Heart disease Sister        Social History     Socioeconomic History    Marital status:    Tobacco Use    Smoking status: Smoker, Current Status Unknown     Packs/day: 0.50     Types: Cigarettes    Smokeless tobacco: Never   Substance and Sexual Activity    Alcohol use: Not Currently    Drug use: Never       Current Outpatient Medications   Medication Sig Dispense Refill    aspirin (ECOTRIN) 81 MG EC tablet Take 81 mg by mouth once daily.      atorvastatin (LIPITOR) 20 MG tablet Take 20 mg by mouth once daily.      benazepriL (LOTENSIN) 40 MG tablet Take 40 mg by mouth once daily.      diclofenac sodium (VOLTAREN) 1 % Gel Apply topically.      finasteride (PROSCAR) 5 mg tablet Take 5 mg by mouth once daily.      multivitamin (THERAGRAN) per tablet Take 1 tablet by mouth once daily.      tamsulosin (FLOMAX) 0.4 mg Cap Take 1 capsule by mouth once daily.      levothyroxine (SYNTHROID) 100 MCG tablet Take 100 mcg by mouth Daily.      NP THYROID 60 mg Tab Take 60 mg by mouth Daily.       No current facility-administered medications for this visit.       Review of patient's allergies indicates:  No Known Allergies    Review of Systems   Constitutional: Negative.  Negative for chills, fever and malaise/fatigue.   HENT: Negative.     Eyes: Negative.    Respiratory: Negative.   Negative for cough and shortness of breath.    Cardiovascular: Negative.  Negative for chest pain.   Gastrointestinal: Negative.  Negative for abdominal pain, diarrhea, nausea and vomiting.   Genitourinary: Negative.    Musculoskeletal: Negative.    Skin: Negative.  Negative for itching and rash.   Neurological: Negative.  Negative for focal weakness.   Endo/Heme/Allergies: Negative.    Psychiatric/Behavioral: Negative.            Physical exam   Vitals:    03/09/23 1344   BP: 124/74   Pulse: 89   Resp: 20   Temp: 98.2 °F (36.8 °C)               Physical Exam  HENT:      Head: Normocephalic.   Cardiovascular:      Rate and Rhythm: Normal rate.   Pulmonary:      Effort: Pulmonary effort is normal.      Breath sounds: Normal breath sounds.   Abdominal:      Palpations: Abdomen is soft.   Musculoskeletal:      Cervical back: Neck supple.   Skin:     General: Skin is warm.   Neurological:      Mental Status: He is alert and oriented to person, place, and time.   Psychiatric:         Mood and Affect: Affect normal.              Assessment:       # Urothelial carcinoma, right, upper ureter, low-grade, pT3 pNxMX, at least stage III  Negative margins, negative for lymphovascular invasion.    Of note patient had a diagnosis of non-muscle invasive bladder cancer in the past treated with resection followed by intravesical therapy.  Discussed with patient the diagnosis, staging and treatment recommendations including adjuvant therapy in the setting of T3 disease.    Patient is a relatively fit, currently works part-time.  He does have mild hearing loss.  Denies any symptoms of neuropathy.    Discussed the recommendations to undergo adjuvant platinum-based chemotherapy given T3 disease after completion of staging workup.     Patient is hesitant to consider chemotherapy given his experience with his family members going through it.    Noted repeat CT chest abdomen pelvis IV contrast on 09/06/2022 without evidence of metastatic  disease   Recommend adjuvant chemotherapy with gem cis.  Discussed the category 2B recommendation for nivolumab in adjuvant setting.  Patient would like to think about his options prior to making a decision.   He called our office to let us know that he would like to proceed with adjuvant nivolumab.    Initiated adjuvant nivolumab on 09/21/2022.    #Hypothyroidism  - noted very high TSH of 92.6 with free T4 of less than 0.42 while during previous visit was noted to have very low TSH of 0.020 (11/2022) & 0.039 (10/2022). Previously 2.83 (09/2022).  - this could be initially because of the thyroiditis after being started on autoimmune disease and now developed hypothyroidism.  Thus will start the patient on low-dose of levothyroxine for now and referred to endocrinology for further evaluation  - cortisol 6.6 (11/2022)  - Hypothyroidism currently being treated by Dr Aguilar, endocrinologist. He was started on levothyroxine 50 mcg X 1 week and will begin levothyroxine 100 mcg daily in a couple days.     Plan:     Reviewed labs,TSH 79.7 today patient stopped levothyroxine. Will hold nivolumab today  Plan to f/u next week. Patient wants to discuss discontinuing treatment.  Requested in office f/u with  Dr. Aguilar by next week for elevated TSH and noncompliance with levothyroxine.  Once patient resumes appropriate thyroid medication we can resume nivolumab.  Once completed above recommendations the may follow-up in clinic in 4 weeks with repeat labs prior to next cycle of nivolumab.    Plan is for total 1 year of adjuvant nivolumab.

## 2023-03-16 ENCOUNTER — OFFICE VISIT (OUTPATIENT)
Dept: HEMATOLOGY/ONCOLOGY | Facility: CLINIC | Age: 78
End: 2023-03-16
Payer: MEDICARE

## 2023-03-16 DIAGNOSIS — E03.2 HYPOTHYROIDISM DUE TO MEDICATION: ICD-10-CM

## 2023-03-16 DIAGNOSIS — C68.9 UROTHELIAL CARCINOMA: Primary | ICD-10-CM

## 2023-03-16 PROCEDURE — 99215 PR OFFICE/OUTPT VISIT, EST, LEVL V, 40-54 MIN: ICD-10-PCS | Mod: ,,, | Performed by: INTERNAL MEDICINE

## 2023-03-16 PROCEDURE — 99215 OFFICE O/P EST HI 40 MIN: CPT | Mod: ,,, | Performed by: INTERNAL MEDICINE

## 2023-03-16 NOTE — PROGRESS NOTES
Referring provider Dr. Hensley  Reason for consult:  Urothelial carcinoma    Diagnosis:  - Urothelial ca pT3 pNxMX, at least stage III     Treatment History:  -Nephro ureterectomy on 23rd July 2022. Refused chemo.  Nivolumab of.    Current Treatment:   -  Started with  adjuvant Nivolumab  on 09/21/2022.    HPI  Patient is a 76-year-old with history of hypertension and BPH who presented to the ER in May 2022 with symptoms of hematuria.  A CT at that point revealed an obstructing mass in the right renal pelvis for which he was seen by Dr. Hensley and underwent a diagnostic ureteroscopy, pathology from which revealed urothelial carcinoma.  Patient underwent a right ureteronephrectomy on 23rd July 2022.  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  Patient presented to clinic to establish care to discuss further disease management.      INTERVAL HISTORY:   Today, 03/9/2023, patient denies any acute concerns today.  He reports tolerating his last cycle of treatment well without any significant side effects. Patient has stopped taking thyroid medication as it is causing severe stomach upset. He has reached out to Dr. Pierre's office a couple of times but did not have an appt. TSH is now 3 x that from previous visit and he has not taken any thyroid medication in over a week.  Patient states he declines to continue with nivolumab. Denies any symptoms of new foci of pain, decreased appetite, weight loss, fatigue, constipation  Patient is a current smoker, 25 pack year smoking history, denies alcohol or recreational drug use.  He currently works part-time, [employer].  Denies any family history of malignancy.  Lives with his wife.  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  +++++++++++++++++++++++++++++++++++++++++++++++++++++++++++  OFFICE VISIT MARCH 16, 2023; Mr. Rosado has been doing  very poorly for the past 4-6 weeks, it appears all to be secondary to his severe case of hypothyroidism, induced by NIVOLUMAB . The TSH  yesterday   was 89 uIU/ml normal less than 4.  He was so refractory to thyroid replacement that he is being followed by  Dr Aguilar  his endocrinologist.  He was intolerant to various medications.  Today he came in declining  adjuvant nivolumab, because it made him so sick .  However his wife and I convinced him that if he were to improve we could resume it in 6 weeks.  We will hold any treatment until he sees his endocrinologist, hopefully his TSH would have decline, and then he will decide whether to resume any volume of.       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  Margins free of tumor, lymphovascular invasion not seen.  No regional lymph nodes submitted.  PT3 pNX pGlow-grade    Imaging   May 2022:  CT abdomen pelvis with contrast:  The filling defect in the right renal pelvis is most suggestive of a mass with neoplasm a concern.  This lesion also produces relatively delay in contrast opacification of the right kidney indicating partial urinary obstruction.    09/06/2022 CT chest abdomen pelvis with IV contrast:  No acute pulmonary disease, adenopathy or suspicious pulmonary nodules identified.  Is suspected cyst is visible in the right thyroid lobe, evaluation with dedicated ultrasound to be considered.  Status post right nephrectomy and apparent surgery involving the right lateral wall of the urinary bladder, no gross mucosal lesions visible in the bladder although there is relative wall thickening identified inferiorly and posteriorly.      LAB:   03/16/2023:  Hemoglobin 15.3, hematocrit 47%, WBC 10k platelets 173 K.  BUN 27 creatinine 1.84.  TSH is still very high 89 uI/ml    Past Medical History:    Diagnosis Date    Bladder cancer     Enlarged prostate     Hyperlipidemia     Hypertension     Urothelial carcinoma of kidney, right        Past Surgical History:   Procedure Laterality Date    KNEE SURGERY      LAPAROSCOPIC ROBOT-ASSISTED SURGICAL REMOVAL OF KIDNEY USING DA JAZMIN XI  07/20/2022    SHOULDER SURGERY         Family History   Problem Relation Age of Onset    Heart disease Mother     Heart disease Father     Heart disease Sister        Social History     Socioeconomic History    Marital status:    Tobacco Use    Smoking status: Smoker, Current Status Unknown     Packs/day: 0.50     Types: Cigarettes    Smokeless tobacco: Never   Substance and Sexual Activity    Alcohol use: Not Currently    Drug use: Never       Current Outpatient Medications   Medication Sig Dispense Refill    aspirin (ECOTRIN) 81 MG EC tablet Take 81 mg by mouth once daily.      atorvastatin (LIPITOR) 20 MG tablet Take 20 mg by mouth once daily.      benazepriL (LOTENSIN) 40 MG tablet Take 40 mg by mouth once daily.      diclofenac sodium (VOLTAREN) 1 % Gel Apply topically.      finasteride (PROSCAR) 5 mg tablet Take 5 mg by mouth once daily.      levothyroxine (SYNTHROID) 100 MCG tablet Take 100 mcg by mouth Daily.      multivitamin (THERAGRAN) per tablet Take 1 tablet by mouth once daily.      NP THYROID 60 mg Tab Take 60 mg by mouth Daily.      tamsulosin (FLOMAX) 0.4 mg Cap Take 1 capsule by mouth once daily.       No current facility-administered medications for this visit.       Review of patient's allergies indicates:  No Known Allergies    Review of Systems   Constitutional: Negative.  Negative for chills, fever and malaise/fatigue.   HENT: Negative.     Eyes: Negative.    Respiratory: Negative.  Negative for cough and shortness of breath.    Cardiovascular: Negative.  Negative for chest pain.   Gastrointestinal: Negative.  Negative for abdominal pain, diarrhea, nausea and vomiting.   Genitourinary: Negative.     Musculoskeletal: Negative.    Skin: Negative.  Negative for itching and rash.   Neurological: Negative.  Negative for focal weakness.   Endo/Heme/Allergies: Negative.    Psychiatric/Behavioral: Negative.     All other systems reviewed and are negative.     OBJECTIVE   There were no vitals taken for this visit.   Physical Exam  HENT:      Head: Normocephalic.   Cardiovascular:      Rate and Rhythm: Normal rate.   Pulmonary:      Effort: Pulmonary effort is normal.      Breath sounds: Normal breath sounds.   Abdominal:      Palpations: Abdomen is soft.   Musculoskeletal:      Cervical back: Neck supple.   Skin:     General: Skin is warm.   Neurological:      Mental Status: He is alert and oriented to person, place, and time.   Psychiatric:         Mood and Affect: Affect normal.       Assessment:       # Urothelial carcinoma, right, upper ureter, low-grade, pT3 pNxMX, at least stage III  Negative margins, negative for lymphovascular invasion.    Of note patient had a diagnosis of non-muscle invasive bladder cancer in the past treated with resection followed by intravesical therapy.  Discussed with patient the diagnosis, staging and treatment recommendations including adjuvant therapy in the setting of T3 disease.    Patient is a relatively fit, currently works part-time.  He does have mild hearing loss.  Denies any symptoms of neuropathy.    Discussed the recommendations to undergo adjuvant platinum-based chemotherapy given T3 disease after completion of staging workup.     Patient is hesitant to consider chemotherapy given his experience with his family members going through it.    Noted repeat CT chest abdomen pelvis IV contrast on 09/06/2022 without evidence of metastatic disease   Recommend adjuvant chemotherapy with gem cis.  Discussed the category 2B recommendation for nivolumab in adjuvant setting.  Patient would like to think about his options prior to making a decision.   He called our office to let us know  that he would like to proceed with adjuvant nivolumab.    Initiated adjuvant nivolumab on 09/21/2022..  Today March 16, 2023, decline the schedule dose of nivolumab until he improves.  He is to see Dr. Aguilar in 5 weeks before he returns to our clinic.    #Hypothyroidism  - noted very high TSH of 92.6 with free T4 of less than 0.42 while during previous visit was noted to have very low TSH of 0.020 (11/2022) & 0.039 (10/2022). Previously 2.83 (09/2022).  - this could be initially because of the thyroiditis after being started on autoimmune disease and now developed hypothyroidism.  Thus will start the patient on low-dose of levothyroxine for now and referred to endocrinology for further evaluation  - cortisol 6.6 (11/2022)  - Hypothyroidism currently being treated by Dr Aguilar, endocrinologist. He was started on levothyroxine 50 mcg X 1 week and will begin levothyroxine 100 mcg daily in a couple days.     Plan:     Reviewed labs,TSH even higher than last month, an 86, however followed by Endocrine apparently the patient stopped levothyroxine. Will hold nivolumab today  Follow-up in 6 weeks , he did not want to receive the a new volume of dose today  Requested in office f/u with  Dr. Aguilar , who saw him once I will see him in about 5 weeks .   Once patient resumes appropriate thyroid medication he will consider resuming nivolumab.  Return to clinic April 20th 2023  Plan is for total 1 year of adjuvant nivolumab.      Isaac Olson MD

## 2023-04-20 ENCOUNTER — OFFICE VISIT (OUTPATIENT)
Dept: HEMATOLOGY/ONCOLOGY | Facility: CLINIC | Age: 78
End: 2023-04-20
Payer: MEDICARE

## 2023-04-20 VITALS
DIASTOLIC BLOOD PRESSURE: 73 MMHG | WEIGHT: 196.5 LBS | HEART RATE: 76 BPM | TEMPERATURE: 98 F | RESPIRATION RATE: 20 BRPM | BODY MASS INDEX: 29.1 KG/M2 | OXYGEN SATURATION: 96 % | HEIGHT: 69 IN | SYSTOLIC BLOOD PRESSURE: 144 MMHG

## 2023-04-20 DIAGNOSIS — C64.1 UROTHELIAL CARCINOMA OF KIDNEY, RIGHT: Primary | ICD-10-CM

## 2023-04-20 PROCEDURE — 99215 OFFICE O/P EST HI 40 MIN: CPT | Mod: ,,,

## 2023-04-20 PROCEDURE — 99215 PR OFFICE/OUTPT VISIT, EST, LEVL V, 40-54 MIN: ICD-10-PCS | Mod: ,,,

## 2023-04-20 RX ORDER — HEPARIN 100 UNIT/ML
500 SYRINGE INTRAVENOUS
Status: CANCELLED | OUTPATIENT
Start: 2023-04-20

## 2023-04-20 RX ORDER — SODIUM CHLORIDE 0.9 % (FLUSH) 0.9 %
10 SYRINGE (ML) INJECTION
Status: CANCELLED | OUTPATIENT
Start: 2023-04-20

## 2023-04-20 RX ORDER — LEVOTHYROXINE SODIUM 150 UG/1
150 TABLET ORAL
COMMUNITY
Start: 2023-04-17 | End: 2023-07-13

## 2023-04-20 NOTE — PROGRESS NOTES
Referring provider Dr. Hensley  Reason for consult:  Urothelial carcinoma    Diagnosis:  - Urothelial ca pT3 pNxMX, at least stage III     Treatment History:  -Nephro ureterectomy on 23rd July 2022. Refused chemo.  Nivolumab of.    Current Treatment:   -  Started with  adjuvant Nivolumab  on 09/21/2022.    HPI  Patient is a 76-year-old with history of hypertension and BPH who presented to the ER in May 2022 with symptoms of hematuria.  A CT at that point revealed an obstructing mass in the right renal pelvis for which he was seen by Dr. Hensley and underwent a diagnostic ureteroscopy, pathology from which revealed urothelial carcinoma.  Patient underwent a right ureteronephrectomy on 23rd July 2022.  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  Patient presented to clinic to establish care to discuss further disease management.      INTERVAL HISTORY:   Today, 04/20/2023, patient denies any acute concerns today.  Patient's symptoms from hypothyroidism have greatly improved since compliance with levothyroxine now taking 150 mcg daily.  He continues to f/u with Dr. Pierre's office, last visit 1 week ago. He is agreeable to resume nivolumab today. He is unsure if he wants to complete the year of treatment as recommended.  He does report 2-3 occasional episodes of diarrhea controlled with Imodium.  His appetite is good, although he states weight loss of around 5 pounds since increase dose of levothyroxine. Denies any symptoms of new foci of pain, decreased appetite, fatigue, constipation        Patient is a current smoker, 25 pack year smoking history, denies alcohol or recreational drug use.  He currently works part-time, [employer].  Denies any family history of malignancy.  Lives with his wife.  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  Margins free of tumor, lymphovascular invasion not seen.  No regional lymph nodes submitted.  PT3 pNX pGlow-grade    Imaging   May 2022:  CT abdomen pelvis with contrast:  The filling defect in the right renal pelvis is most suggestive of a mass with neoplasm a concern.  This lesion also produces relatively delay in contrast opacification of the right kidney indicating partial urinary obstruction.    09/06/2022 CT chest abdomen pelvis with IV contrast:  No acute pulmonary disease, adenopathy or suspicious pulmonary nodules identified.  Is suspected cyst is visible in the right thyroid lobe, evaluation with dedicated ultrasound to be considered.  Status post right nephrectomy and apparent surgery involving the right lateral wall of the urinary bladder, no gross mucosal lesions visible in the bladder although there is relative wall thickening identified inferiorly and posteriorly.      LAB:     04/13/23: TSH 5.1882, T4 1.24, cr 1.56, alb 3.9, ca 9.53, LFTs WNL, wbc 9.75, hgb 14.8, plt 197, ANC 6.53  03/16/2023:  Hemoglobin 15.3, hematocrit 47%, WBC 10k platelets 173 K.  BUN 27 creatinine 1.84.  TSH is still very high 89 uI/ml  03.08/23: Cr 1.93, ca 9.52, alb 4.1, LFTs WNL, cortisol 8.6, TSH 79.73 T4 .50, wbc 9.08, hgb 14.9, plt 185, ANC 6.04  02/08/2023:  Creatinine 1.8, albumin 4.2, calcium 9.4, total bili 0.9, AST 18, ALT 15, TSH 25, cortisol 9.6, WBC 9.3, hemoglobin 14.8, .1, platelet count 192.  ANC 6.3.  Magnesium 2.1, phosphorus 3.3.  01/11/23: Cr 1.70, K+ 5.23, mag 2.1, phosphorous 4.7, LFTs WNL, TSH 98.9, Free T4 0.53, wbc 10.84, hgb 14.6, plt 190, ANC 7.48  12/14/2022-potassium 5.3, creatinine 1.74 WBC 10.05, hemoglobin 14.6, platelets 188, TSH 92.608, free T4 less than 0.42  11/16/2022:  Creatinine 1.6, albumin 3.8, calcium 9.5,  alkaline phosphatase 100, total bili 0.4, AST 16, ALT 20, TSH 0.020, free T4 pending, WBC count 9.6, hemoglobin 13.7, .5, platelet count 202, ANC 6.36.     10/19/2022:  Creatinine 1.64, albumin 3.8, alkaline phosphatase 110, LFTs within normal limits, TSH 0.039, cortisol 4.9, WBC count 7.6, hemoglobin 13.6, .3, platelet count 191 000, ANC 4.75.    09/02/2022:  Creatinine 1.44, albumin 4.0, alkaline phosphatase 98, total bili 0.7, AST 18, ALT 18, WBC count 9.06, hemoglobin 14.5, platelet count 173.  ANC 5.70.     Past Medical History:   Diagnosis Date    Bladder cancer     Enlarged prostate     Hyperlipidemia     Hypertension     Urothelial carcinoma of kidney, right        Past Surgical History:   Procedure Laterality Date    KNEE SURGERY      LAPAROSCOPIC ROBOT-ASSISTED SURGICAL REMOVAL OF KIDNEY USING DA JAZMIN XI  07/20/2022    SHOULDER SURGERY         Family History   Problem Relation Age of Onset    Heart disease Mother     Heart disease Father     Heart disease Sister        Social History     Socioeconomic History    Marital status:    Tobacco Use    Smoking status: Smoker, Current Status Unknown     Packs/day: 0.50     Types: Cigarettes    Smokeless tobacco: Never   Substance and Sexual Activity    Alcohol use: Not Currently    Drug use: Never       Current Outpatient Medications   Medication Sig Dispense Refill    aspirin (ECOTRIN) 81 MG EC tablet Take 81 mg by mouth once daily.      atorvastatin (LIPITOR) 20 MG tablet Take 20 mg by mouth once daily.      benazepriL (LOTENSIN) 40 MG tablet Take 40 mg by mouth once daily.      diclofenac sodium (VOLTAREN) 1 % Gel Apply topically.      finasteride (PROSCAR) 5 mg tablet Take 5 mg by mouth once daily.      levothyroxine (SYNTHROID) 100 MCG tablet Take 100 mcg by mouth Daily.      multivitamin (THERAGRAN) per tablet Take 1 tablet by mouth once daily.      NP THYROID 60 mg Tab Take 60 mg by mouth Daily.      tamsulosin (FLOMAX) 0.4 mg Cap Take  "1 capsule by mouth once daily.       No current facility-administered medications for this visit.       Review of patient's allergies indicates:  No Known Allergies    Review of Systems   Constitutional: Negative.  Negative for chills, fever and malaise/fatigue.   HENT: Negative.     Eyes: Negative.    Respiratory: Negative.  Negative for cough and shortness of breath.    Cardiovascular: Negative.  Negative for chest pain.   Gastrointestinal: Negative.  Negative for abdominal pain, diarrhea, nausea and vomiting.   Genitourinary: Negative.    Musculoskeletal: Negative.    Skin: Negative.  Negative for itching and rash.   Neurological: Negative.  Negative for focal weakness.   Endo/Heme/Allergies: Negative.    Psychiatric/Behavioral: Negative.     All other systems reviewed and are negative.     OBJECTIVE   Blood pressure (!) 144/73, pulse 76, temperature 98 °F (36.7 °C), temperature source Oral, resp. rate 20, height 5' 9" (1.753 m), weight 89.1 kg (196 lb 8 oz), SpO2 96 %.   Physical Exam  HENT:      Head: Normocephalic.   Cardiovascular:      Rate and Rhythm: Normal rate.   Pulmonary:      Effort: Pulmonary effort is normal.      Breath sounds: Normal breath sounds.   Abdominal:      Palpations: Abdomen is soft.   Musculoskeletal:      Cervical back: Neck supple.   Skin:     General: Skin is warm.   Neurological:      Mental Status: He is alert and oriented to person, place, and time.   Psychiatric:         Mood and Affect: Affect normal.       Assessment:       # Urothelial carcinoma, right, upper ureter, low-grade, pT3 pNxMX, at least stage III  Negative margins, negative for lymphovascular invasion.    Of note patient had a diagnosis of non-muscle invasive bladder cancer in the past treated with resection followed by intravesical therapy.  Discussed with patient the diagnosis, staging and treatment recommendations including adjuvant therapy in the setting of T3 disease.    Patient is a relatively fit, currently " works part-time.  He does have mild hearing loss.  Denies any symptoms of neuropathy.    Discussed the recommendations to undergo adjuvant platinum-based chemotherapy given T3 disease after completion of staging workup.     Patient is hesitant to consider chemotherapy given his experience with his family members going through it.    Noted repeat CT chest abdomen pelvis IV contrast on 09/06/2022 without evidence of metastatic disease   Recommend adjuvant chemotherapy with gem cis.  Discussed the category 2B recommendation for nivolumab in adjuvant setting.  Patient would like to think about his options prior to making a decision.   He called our office to let us know that he would like to proceed with adjuvant nivolumab.    Initiated adjuvant nivolumab on 09/21/2022..  Today March 16, 2023, decline the schedule dose of nivolumab until he improves.  He is to see Dr. Aguilar in 5 weeks before he returns to our clinic.    #Hypothyroidism  - noted very high TSH of 92.6 with free T4 of less than 0.42 while during previous visit was noted to have very low TSH of 0.020 (11/2022) & 0.039 (10/2022). Previously 2.83 (09/2022).  - this could be initially because of the thyroiditis after being started on autoimmune disease and now developed hypothyroidism.  Thus will start the patient on low-dose of levothyroxine for now and referred to endocrinology for further evaluation  - cortisol 6.6 (11/2022)  - Hypothyroidism currently being treated by Dr Aguilar, endocrinologist. He was started on levothyroxine 50 mcg X 1 week and will begin levothyroxine 100 mcg daily in a couple days.   - Now Levothyroxine increased from 100 mcg to 150 mcg. TSH 5.188 and T4 1.24. Will continue to monitor.     Plan:   Reviewed labs, TSH trending down from 86 to 5.1882, since levothyroxine increased to 150 mcg. Patient is agreeable to resume nivolumab treatments.  Continue with Dr. 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    Will repeat CT CAP prior to RTC  RTC 4 weeks w/ CBC, CMP TSH, T4, cortisol  Plan is for total 1 year of adjuvant nivolumab.

## 2023-05-18 ENCOUNTER — OFFICE VISIT (OUTPATIENT)
Dept: HEMATOLOGY/ONCOLOGY | Facility: CLINIC | Age: 78
End: 2023-05-18
Payer: MEDICARE

## 2023-05-18 VITALS
HEART RATE: 78 BPM | SYSTOLIC BLOOD PRESSURE: 144 MMHG | WEIGHT: 194.38 LBS | BODY MASS INDEX: 28.79 KG/M2 | OXYGEN SATURATION: 96 % | RESPIRATION RATE: 18 BRPM | DIASTOLIC BLOOD PRESSURE: 76 MMHG | HEIGHT: 69 IN | TEMPERATURE: 99 F

## 2023-05-18 DIAGNOSIS — Z29.89 ENCOUNTER FOR IMMUNOTHERAPY: ICD-10-CM

## 2023-05-18 DIAGNOSIS — C68.9 UROTHELIAL CARCINOMA: Primary | ICD-10-CM

## 2023-05-18 PROCEDURE — 99215 OFFICE O/P EST HI 40 MIN: CPT | Mod: ,,, | Performed by: STUDENT IN AN ORGANIZED HEALTH CARE EDUCATION/TRAINING PROGRAM

## 2023-05-18 PROCEDURE — 99215 PR OFFICE/OUTPT VISIT, EST, LEVL V, 40-54 MIN: ICD-10-PCS | Mod: ,,, | Performed by: STUDENT IN AN ORGANIZED HEALTH CARE EDUCATION/TRAINING PROGRAM

## 2023-05-18 RX ORDER — SODIUM CHLORIDE 0.9 % (FLUSH) 0.9 %
10 SYRINGE (ML) INJECTION
Status: CANCELLED | OUTPATIENT
Start: 2023-05-18

## 2023-05-18 RX ORDER — HYDROCODONE BITARTRATE AND ACETAMINOPHEN 7.5; 325 MG/1; MG/1
1 TABLET ORAL EVERY 8 HOURS PRN
COMMUNITY
Start: 2023-04-01 | End: 2023-07-26

## 2023-05-18 RX ORDER — HEPARIN 100 UNIT/ML
500 SYRINGE INTRAVENOUS
Status: CANCELLED | OUTPATIENT
Start: 2023-05-18

## 2023-05-18 NOTE — PROGRESS NOTES
Referring provider Dr. Hensley  Reason for consult:  Urothelial carcinoma    Diagnosis:  - Urothelial ca pT3 pNxMX, at least stage III     Treatment History:  -Nephro ureterectomy on 23rd July 2022. Refused chemo.     Current Treatment:   -  Started with  adjuvant Nivolumab  on 09/21/2022.    HPI  Patient is a 76-year-old with history of hypertension and BPH who presented to the ER in May 2022 with symptoms of hematuria.  A CT at that point revealed an obstructing mass in the right renal pelvis for which he was seen by Dr. Hensley and underwent a diagnostic ureteroscopy, pathology from which revealed urothelial carcinoma.  Patient underwent a right ureteronephrectomy on 23rd July 2022.  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  Patient presented to clinic to establish care to discuss further disease management.    Patient was started on adjuvant nivolumab on 09/21/2022.    INTERVAL HISTORY:   Today, 05/18/2023,    Patient is a current smoker, 25 pack year smoking history, denies alcohol or recreational drug use.  He currently works part-time, [employer].  Denies any family history of malignancy.  Lives with his wife.  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  Margins free of tumor, lymphovascular invasion not seen.  No regional lymph nodes submitted.  PT3 pNX pGlow-grade    Imaging         May 2022:  CT abdomen pelvis with contrast:  The filling defect in the right renal pelvis is most suggestive of a mass with neoplasm a concern.  This lesion also produces relatively delay in contrast opacification of the right kidney indicating partial urinary  obstruction.    09/06/2022 CT chest abdomen pelvis with IV contrast:  No acute pulmonary disease, adenopathy or suspicious pulmonary nodules identified.  Is suspected cyst is visible in the right thyroid lobe, evaluation with dedicated ultrasound to be considered.  Status post right nephrectomy and apparent surgery involving the right lateral wall of the urinary bladder, no gross mucosal lesions visible in the bladder although there is relative wall thickening identified inferiorly and posteriorly.    05/16/2023 CT chest abdomen pelvis with contrast:  No evidence of neoplasm in the chest, small pericardial effusion stable.  Tiny hepatic cysts, prior right nephrectomy, atherosclerotic and degenerative changes, small umbilical hernia      LAB:   05/17/2023:  TSH 0.6, free T4 1.2, albumin 3.9, calcium 9.4, LFTs within normal limits, cortisol 12, creatinine 1.48, WBC count 8.01, hemoglobin 14.9, .7, platelet count 188, ANC 4.91.  04/13/23: TSH 5.1882, T4 1.24, cr 1.56, alb 3.9, ca 9.53, LFTs WNL, wbc 9.75, hgb 14.8, plt 197, ANC 6.53  03/16/2023:  Hemoglobin 15.3, hematocrit 47%, WBC 10k platelets 173 K.  BUN 27 creatinine 1.84.  TSH is still very high 89 uI/ml  03.08/23: Cr 1.93, ca 9.52, alb 4.1, LFTs WNL, cortisol 8.6, TSH 79.73 T4 .50, wbc 9.08, hgb 14.9, plt 185, ANC 6.04  02/08/2023:  Creatinine 1.8, albumin 4.2, calcium 9.4, total bili 0.9, AST 18, ALT 15, TSH 25, cortisol 9.6, WBC 9.3, hemoglobin 14.8, .1, platelet count 192.  ANC 6.3.  Magnesium 2.1, phosphorus 3.3.  01/11/23: Cr 1.70, K+ 5.23, mag 2.1, phosphorous 4.7, LFTs WNL, TSH 98.9, Free T4 0.53, wbc 10.84, hgb 14.6, plt 190, ANC 7.48  12/14/2022-potassium 5.3, creatinine 1.74 WBC 10.05, hemoglobin 14.6, platelets 188, TSH 92.608, free T4 less than 0.42  11/16/2022:  Creatinine 1.6, albumin 3.8, calcium 9.5, alkaline phosphatase 100, total bili 0.4, AST 16, ALT 20, TSH 0.020, free T4 pending, WBC count 9.6, hemoglobin 13.7, .5,  platelet count 202, ANC 6.36.     10/19/2022:  Creatinine 1.64, albumin 3.8, alkaline phosphatase 110, LFTs within normal limits, TSH 0.039, cortisol 4.9, WBC count 7.6, hemoglobin 13.6, .3, platelet count 191 000, ANC 4.75.    09/02/2022:  Creatinine 1.44, albumin 4.0, alkaline phosphatase 98, total bili 0.7, AST 18, ALT 18, WBC count 9.06, hemoglobin 14.5, platelet count 173.  ANC 5.70.     Past Medical History:   Diagnosis Date    Bladder cancer     Enlarged prostate     Hyperlipidemia     Hypertension     Urothelial carcinoma of kidney, right        Past Surgical History:   Procedure Laterality Date    KNEE SURGERY      LAPAROSCOPIC ROBOT-ASSISTED SURGICAL REMOVAL OF KIDNEY USING DA JAZMIN XI  07/20/2022    SHOULDER SURGERY         Family History   Problem Relation Age of Onset    Heart disease Mother     Heart disease Father     Heart disease Sister        Social History     Socioeconomic History    Marital status:    Tobacco Use    Smoking status: Smoker, Current Status Unknown     Packs/day: 0.50     Types: Cigarettes    Smokeless tobacco: Never   Substance and Sexual Activity    Alcohol use: Not Currently    Drug use: Never       Current Outpatient Medications   Medication Sig Dispense Refill    aspirin (ECOTRIN) 81 MG EC tablet Take 81 mg by mouth once daily.      atorvastatin (LIPITOR) 20 MG tablet Take 20 mg by mouth once daily.      benazepriL (LOTENSIN) 40 MG tablet Take 40 mg by mouth once daily.      diclofenac sodium (VOLTAREN) 1 % Gel Apply topically.      finasteride (PROSCAR) 5 mg tablet Take 5 mg by mouth once daily.      HYDROcodone-acetaminophen (NORCO) 7.5-325 mg per tablet Take 1 tablet by mouth every 8 (eight) hours as needed.      levothyroxine (SYNTHROID) 100 MCG tablet Take 100 mcg by mouth Daily.      levothyroxine (SYNTHROID) 150 MCG tablet Take 150 mcg by mouth.      multivitamin (THERAGRAN) per tablet Take 1 tablet by mouth once daily.      tamsulosin (FLOMAX) 0.4 mg Cap  "Take 1 capsule by mouth once daily.      NP THYROID 60 mg Tab Take 60 mg by mouth Daily.       No current facility-administered medications for this visit.       Review of patient's allergies indicates:  No Known Allergies    Review of Systems   Constitutional: Negative.  Negative for chills, fever and malaise/fatigue.   HENT: Negative.     Eyes: Negative.    Respiratory: Negative.  Negative for cough and shortness of breath.    Cardiovascular: Negative.  Negative for chest pain.   Gastrointestinal: Negative.  Negative for abdominal pain, diarrhea, nausea and vomiting.   Genitourinary: Negative.    Musculoskeletal: Negative.    Skin: Negative.  Negative for itching and rash.   Neurological: Negative.  Negative for focal weakness.   Endo/Heme/Allergies: Negative.    Psychiatric/Behavioral: Negative.     All other systems reviewed and are negative.     OBJECTIVE   Blood pressure (!) 144/76, pulse 78, temperature 98.7 °F (37.1 °C), temperature source Oral, resp. rate 18, height 5' 9" (1.753 m), weight 88.2 kg (194 lb 6.4 oz), SpO2 96 %.   Physical Exam  HENT:      Head: Normocephalic.   Cardiovascular:      Rate and Rhythm: Normal rate.   Pulmonary:      Effort: Pulmonary effort is normal.      Breath sounds: Normal breath sounds.   Abdominal:      Palpations: Abdomen is soft.   Musculoskeletal:      Cervical back: Neck supple.   Skin:     General: Skin is warm.   Neurological:      Mental Status: He is alert and oriented to person, place, and time.   Psychiatric:         Mood and Affect: Affect normal.       Assessment:       # Urothelial carcinoma, right, upper ureter, low-grade, pT3 pNxMX, at least stage III  Negative margins, negative for lymphovascular invasion.    Of note patient had a diagnosis of non-muscle invasive bladder cancer in the past treated with resection followed by intravesical therapy.  Discussed with patient the diagnosis, staging and treatment recommendations including adjuvant therapy in the " setting of T3 disease.    Patient is a relatively fit, currently works part-time.  He does have mild hearing loss.  Denies any symptoms of neuropathy.    Discussed the recommendations to undergo adjuvant platinum-based chemotherapy given T3 disease after completion of staging workup.     Patient is hesitant to consider chemotherapy given his experience with his family members going through it.    Noted repeat CT chest abdomen pelvis IV contrast on 09/06/2022 without evidence of metastatic disease   Recommend adjuvant chemotherapy with gem cis.  Discussed the category 2B recommendation for nivolumab in adjuvant setting.  Patient would like to think about his options prior to making a decision.   He called our office to let us know that he would like to proceed with adjuvant nivolumab.    Initiated adjuvant nivolumab on 09/21/2022..  Patient missed 1 dose of nivolumab in between given issues with thyroid function  Reviewed CT chest abdomen pelvis with contrast from May 2023 without evidence of disease      #Hypothyroidism  - noted very high TSH of 92.6 with free T4 of less than 0.42 while during previous visit was noted to have very low TSH of 0.020 (11/2022) & 0.039 (10/2022). Previously 2.83 (09/2022).  - this could be initially because of the thyroiditis after being started on autoimmune disease and now developed hypothyroidism.  Thus will start the patient on low-dose of levothyroxine for now and referred to endocrinology for further evaluation  - cortisol 6.6 (11/2022)  - Hypothyroidism currently being treated by Dr Aguilar, endocrinologist.   - Now Levothyroxine increased from 100 mcg to 150 mcg.     Plan:     Proceed with next cycle of nivolumab today  Reviewed labs, noted low normal TSH with normal free T4.  Patient was advised to continue 150 mcg of Synthroid for 5 days and change it to 100 mcg over weekends  Continue with Dr. 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  Has a  follow-up scheduled next month  RTC 4 weeks w/ CBC, CMP TSH, T4, cortisol  Plan is for total 1 year of adjuvant nivolumab.          Portions of the record may have been created with voice recognition software. Occasional wrong-word or sound-a-like substitutions may have occurred due to the inherent limitations of voice recognition software. Read the chart carefully and recognize, using context, where substitutions have occurred.

## 2023-06-15 ENCOUNTER — OFFICE VISIT (OUTPATIENT)
Dept: HEMATOLOGY/ONCOLOGY | Facility: CLINIC | Age: 78
End: 2023-06-15
Payer: MEDICARE

## 2023-06-15 VITALS
HEART RATE: 78 BPM | TEMPERATURE: 98 F | BODY MASS INDEX: 28.29 KG/M2 | HEIGHT: 69 IN | OXYGEN SATURATION: 96 % | SYSTOLIC BLOOD PRESSURE: 123 MMHG | RESPIRATION RATE: 20 BRPM | WEIGHT: 191 LBS | DIASTOLIC BLOOD PRESSURE: 69 MMHG

## 2023-06-15 DIAGNOSIS — Z29.89 ENCOUNTER FOR IMMUNOTHERAPY: ICD-10-CM

## 2023-06-15 DIAGNOSIS — C68.9 UROTHELIAL CARCINOMA: Primary | ICD-10-CM

## 2023-06-15 PROCEDURE — 99215 OFFICE O/P EST HI 40 MIN: CPT | Mod: ,,, | Performed by: NURSE PRACTITIONER

## 2023-06-15 PROCEDURE — 99215 PR OFFICE/OUTPT VISIT, EST, LEVL V, 40-54 MIN: ICD-10-PCS | Mod: ,,, | Performed by: NURSE PRACTITIONER

## 2023-06-15 RX ORDER — SODIUM CHLORIDE 0.9 % (FLUSH) 0.9 %
10 SYRINGE (ML) INJECTION
OUTPATIENT
Start: 2023-06-15

## 2023-06-15 RX ORDER — HEPARIN 100 UNIT/ML
500 SYRINGE INTRAVENOUS
OUTPATIENT
Start: 2023-06-15

## 2023-06-15 RX ORDER — HEPARIN 100 UNIT/ML
500 SYRINGE INTRAVENOUS
Status: CANCELLED | OUTPATIENT
Start: 2023-06-15

## 2023-06-15 RX ORDER — SODIUM CHLORIDE 0.9 % (FLUSH) 0.9 %
10 SYRINGE (ML) INJECTION
Status: CANCELLED | OUTPATIENT
Start: 2023-06-15

## 2023-06-15 NOTE — PROGRESS NOTES
Referring provider Dr. Hensley  Reason for consult:  Urothelial carcinoma    Diagnosis:  - Urothelial ca pT3 pNxMX, at least stage III     Treatment History:  -Nephro ureterectomy on 23rd July 2022. Refused chemo.     Current Treatment:   -  Started with  adjuvant Nivolumab  on 09/21/2022.    HPI  Patient is a 76-year-old with history of hypertension and BPH who presented to the ER in May 2022 with symptoms of hematuria.  A CT at that point revealed an obstructing mass in the right renal pelvis for which he was seen by Dr. Hensley and underwent a diagnostic ureteroscopy, pathology from which revealed urothelial carcinoma.  Patient underwent a right ureteronephrectomy on 23rd July 2022.  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  Patient presented to clinic to establish care to discuss further disease management.    Patient was started on adjuvant nivolumab on 09/21/2022.    INTERVAL HISTORY:   Today, 6/15/23 Patient states that is feeling well overall. He tolerated his last treatment well. He states that he has been having an upset stomach/abdominal discomfort, diarrhea at night that doesn't last long.Denies shortness of breath, cough, chest pain. He denies unintentional weight loss, night sweats, any lumps or bumps. His appetite is fair. Mild fatigue that is unchanged, he continues to work.     Patient is a current smoker, 25 pack year smoking history, denies alcohol or recreational drug use.  He currently works part-time, [employer].  Denies any family history of malignancy.  Lives with his wife.  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  Margins free of tumor,  lymphovascular invasion not seen.  No regional lymph nodes submitted.  PT3 pNX pGlow-grade    Imaging         May 2022:  CT abdomen pelvis with contrast:  The filling defect in the right renal pelvis is most suggestive of a mass with neoplasm a concern.  This lesion also produces relatively delay in contrast opacification of the right kidney indicating partial urinary obstruction.    09/06/2022 CT chest abdomen pelvis with IV contrast:  No acute pulmonary disease, adenopathy or suspicious pulmonary nodules identified.  Is suspected cyst is visible in the right thyroid lobe, evaluation with dedicated ultrasound to be considered.  Status post right nephrectomy and apparent surgery involving the right lateral wall of the urinary bladder, no gross mucosal lesions visible in the bladder although there is relative wall thickening identified inferiorly and posteriorly.    05/16/2023 CT chest abdomen pelvis with contrast:  No evidence of neoplasm in the chest, small pericardial effusion stable.  Tiny hepatic cysts, prior right nephrectomy, atherosclerotic and degenerative changes, small umbilical hernia      LAB:   06/14/2023:  TSH 0.6, albumin 3.9, calcium 9.8, LFTs within normal limits, cortisol 8.9, creatinine 1.54, WBC count 9.01, hemoglobin 15.1, .3, platelet count 187, ANC 6.12.  05/17/2023:  TSH 0.6, free T4 1.2, albumin 3.9, calcium 9.4, LFTs within normal limits, cortisol 12, creatinine 1.48, WBC count 8.01, hemoglobin 14.9, .7, platelet count 188, ANC 4.91.  04/13/23: TSH 5.1882, T4 1.24, cr 1.56, alb 3.9, ca 9.53, LFTs WNL, wbc 9.75, hgb 14.8, plt 197, ANC 6.53  03/16/2023:  Hemoglobin 15.3, hematocrit 47%, WBC 10k platelets 173 K.  BUN 27 creatinine 1.84.  TSH is still very high 89 uI/ml  03.08/23: Cr 1.93, ca 9.52, alb 4.1, LFTs WNL, cortisol 8.6, TSH 79.73 T4 .50, wbc 9.08, hgb 14.9, plt 185, ANC 6.04  02/08/2023:  Creatinine 1.8, albumin 4.2, calcium 9.4, total bili 0.9, AST 18, ALT 15, TSH 25,  cortisol 9.6, WBC 9.3, hemoglobin 14.8, .1, platelet count 192.  ANC 6.3.  Magnesium 2.1, phosphorus 3.3.  01/11/23: Cr 1.70, K+ 5.23, mag 2.1, phosphorous 4.7, LFTs WNL, TSH 98.9, Free T4 0.53, wbc 10.84, hgb 14.6, plt 190, ANC 7.48  12/14/2022-potassium 5.3, creatinine 1.74 WBC 10.05, hemoglobin 14.6, platelets 188, TSH 92.608, free T4 less than 0.42  11/16/2022:  Creatinine 1.6, albumin 3.8, calcium 9.5, alkaline phosphatase 100, total bili 0.4, AST 16, ALT 20, TSH 0.020, free T4 pending, WBC count 9.6, hemoglobin 13.7, .5, platelet count 202, ANC 6.36.     10/19/2022:  Creatinine 1.64, albumin 3.8, alkaline phosphatase 110, LFTs within normal limits, TSH 0.039, cortisol 4.9, WBC count 7.6, hemoglobin 13.6, .3, platelet count 191 000, ANC 4.75.    09/02/2022:  Creatinine 1.44, albumin 4.0, alkaline phosphatase 98, total bili 0.7, AST 18, ALT 18, WBC count 9.06, hemoglobin 14.5, platelet count 173.  ANC 5.70.     Past Medical History:   Diagnosis Date    Bladder cancer     Enlarged prostate     Hyperlipidemia     Hypertension     Urothelial carcinoma of kidney, right        Past Surgical History:   Procedure Laterality Date    KNEE SURGERY      LAPAROSCOPIC ROBOT-ASSISTED SURGICAL REMOVAL OF KIDNEY USING DA JAZMIN XI  07/20/2022    SHOULDER SURGERY         Family History   Problem Relation Age of Onset    Heart disease Mother     Heart disease Father     Heart disease Sister        Social History     Socioeconomic History    Marital status:    Tobacco Use    Smoking status: Smoker, Current Status Unknown     Packs/day: 0.50     Types: Cigarettes    Smokeless tobacco: Never   Substance and Sexual Activity    Alcohol use: Not Currently    Drug use: Never       Current Outpatient Medications   Medication Sig Dispense Refill    aspirin (ECOTRIN) 81 MG EC tablet Take 81 mg by mouth once daily.      atorvastatin (LIPITOR) 20 MG tablet Take 20 mg by mouth once daily.      benazepriL (LOTENSIN)  "40 MG tablet Take 40 mg by mouth once daily.      diclofenac sodium (VOLTAREN) 1 % Gel Apply topically.      finasteride (PROSCAR) 5 mg tablet Take 5 mg by mouth once daily.      levothyroxine (SYNTHROID) 100 MCG tablet Take 100 mcg by mouth Daily.      levothyroxine (SYNTHROID) 150 MCG tablet Take 150 mcg by mouth.      multivitamin (THERAGRAN) per tablet Take 1 tablet by mouth once daily.      tamsulosin (FLOMAX) 0.4 mg Cap Take 1 capsule by mouth once daily.      HYDROcodone-acetaminophen (NORCO) 7.5-325 mg per tablet Take 1 tablet by mouth every 8 (eight) hours as needed.      NP THYROID 60 mg Tab Take 60 mg by mouth Daily.       No current facility-administered medications for this visit.       Review of patient's allergies indicates:  No Known Allergies    Review of Systems   Constitutional:  Positive for malaise/fatigue. Negative for chills and fever.   HENT: Negative.     Eyes: Negative.    Respiratory: Negative.  Negative for cough and shortness of breath.    Cardiovascular: Negative.  Negative for chest pain.   Gastrointestinal:  Positive for abdominal pain, diarrhea and nausea. Negative for vomiting.   Genitourinary: Negative.    Musculoskeletal: Negative.    Skin: Negative.  Negative for itching and rash.   Neurological: Negative.  Negative for focal weakness.   Endo/Heme/Allergies: Negative.    Psychiatric/Behavioral: Negative.     All other systems reviewed and are negative.     OBJECTIVE   Blood pressure 123/69, pulse 78, temperature 97.6 °F (36.4 °C), temperature source Oral, resp. rate 20, height 5' 9" (1.753 m), weight 86.6 kg (191 lb), SpO2 96 %.   Physical Exam  HENT:      Head: Normocephalic.      Right Ear: External ear normal.      Left Ear: External ear normal.   Eyes:      Pupils: Pupils are equal, round, and reactive to light.   Cardiovascular:      Rate and Rhythm: Normal rate.   Pulmonary:      Effort: Pulmonary effort is normal.      Breath sounds: Normal breath sounds.   Abdominal:      " General: Bowel sounds are normal.      Palpations: Abdomen is soft.   Musculoskeletal:      Cervical back: Neck supple.   Skin:     General: Skin is warm.   Neurological:      Mental Status: He is alert and oriented to person, place, and time.   Psychiatric:         Mood and Affect: Mood and affect normal.       Assessment:       # Urothelial carcinoma, right, upper ureter, low-grade, pT3 pNxMX, at least stage III  Negative margins, negative for lymphovascular invasion.    Of note patient had a diagnosis of non-muscle invasive bladder cancer in the past treated with resection followed by intravesical therapy.  Discussed with patient the diagnosis, staging and treatment recommendations including adjuvant therapy in the setting of T3 disease.    Patient is a relatively fit, currently works part-time.  He does have mild hearing loss.  Denies any symptoms of neuropathy.    Discussed the recommendations to undergo adjuvant platinum-based chemotherapy given T3 disease after completion of staging workup.     Patient is hesitant to consider chemotherapy given his experience with his family members going through it.    Noted repeat CT chest abdomen pelvis IV contrast on 09/06/2022 without evidence of metastatic disease   Recommend adjuvant chemotherapy with gem cis.  Discussed the category 2B recommendation for nivolumab in adjuvant setting.  Patient would like to think about his options prior to making a decision.   He called our office to let us know that he would like to proceed with adjuvant nivolumab.    Initiated adjuvant nivolumab on 09/21/2022..  Patient missed 1 dose of nivolumab in between given issues with thyroid function  Reviewed CT chest abdomen pelvis with contrast from May 2023 without evidence of disease      #Hypothyroidism  - noted very high TSH of 92.6 with free T4 of less than 0.42 while during previous visit was noted to have very low TSH of 0.020 (11/2022) & 0.039 (10/2022). Previously 2.83  (09/2022).  - this could be initially because of the thyroiditis after being started on autoimmune disease and now developed hypothyroidism.  Thus will start the patient on low-dose of levothyroxine for now and referred to endocrinology for further evaluation  - cortisol 6.6 (11/2022)  - Hypothyroidism currently being treated by Dr Aguilar, endocrinologist.   - Now Levothyroxine increased from 100 mcg to 150 mcg.     Plan:     Proceed with next cycle of nivolumab today 6/15  Reviewed labs,  Patient was advised to continue 150 mcg of Synthroid for 5 days and 100 mcg over weekends  Will given 1/2 L Normal saline today, potassium 5.31 BUN 27.29   Continue with Dr. 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  Has a follow-up scheduled in July   RTC 4 weeks w/ CBC, CMP TSH, T4, cortisol  Plan is for total 1 year of adjuvant nivolumab. (Started 9/21/22)       Pt instructed to call in the interim for any new or worsening concerns or symptoms       CHRISTINE Patel

## 2023-06-28 DIAGNOSIS — I10 HYPERTENSION, UNSPECIFIED TYPE: ICD-10-CM

## 2023-06-28 DIAGNOSIS — E55.9 VITAMIN D DEFICIENCY: ICD-10-CM

## 2023-06-28 DIAGNOSIS — E78.5 HYPERLIPIDEMIA, UNSPECIFIED HYPERLIPIDEMIA TYPE: ICD-10-CM

## 2023-06-28 DIAGNOSIS — Z79.899 ENCOUNTER FOR LONG-TERM (CURRENT) USE OF OTHER MEDICATIONS: Primary | ICD-10-CM

## 2023-06-28 DIAGNOSIS — R97.8 ABNORMAL TUMOR MARKERS: ICD-10-CM

## 2023-07-06 DIAGNOSIS — C67.9 MALIGNANT NEOPLASM OF URINARY BLADDER, UNSPECIFIED SITE: ICD-10-CM

## 2023-07-06 DIAGNOSIS — I10 HYPERTENSION, UNSPECIFIED TYPE: Primary | ICD-10-CM

## 2023-07-06 RX ORDER — BENAZEPRIL HYDROCHLORIDE 40 MG/1
40 TABLET ORAL DAILY
Qty: 90 TABLET | Refills: 1 | Status: SHIPPED | OUTPATIENT
Start: 2023-07-06 | End: 2024-01-09 | Stop reason: SDUPTHER

## 2023-07-06 RX ORDER — TAMSULOSIN HYDROCHLORIDE 0.4 MG/1
1 CAPSULE ORAL DAILY
Qty: 90 CAPSULE | Refills: 1 | Status: SHIPPED | OUTPATIENT
Start: 2023-07-06 | End: 2024-01-09 | Stop reason: SDUPTHER

## 2023-07-13 ENCOUNTER — OFFICE VISIT (OUTPATIENT)
Dept: HEMATOLOGY/ONCOLOGY | Facility: CLINIC | Age: 78
End: 2023-07-13
Payer: MEDICARE

## 2023-07-13 VITALS
RESPIRATION RATE: 18 BRPM | WEIGHT: 192 LBS | TEMPERATURE: 99 F | HEART RATE: 75 BPM | HEIGHT: 69 IN | BODY MASS INDEX: 28.44 KG/M2 | DIASTOLIC BLOOD PRESSURE: 78 MMHG | SYSTOLIC BLOOD PRESSURE: 136 MMHG | OXYGEN SATURATION: 94 %

## 2023-07-13 DIAGNOSIS — E03.2 HYPOTHYROIDISM DUE TO MEDICATION: Primary | ICD-10-CM

## 2023-07-13 DIAGNOSIS — Z29.89 ENCOUNTER FOR IMMUNOTHERAPY: ICD-10-CM

## 2023-07-13 DIAGNOSIS — C68.9 UROTHELIAL CARCINOMA: ICD-10-CM

## 2023-07-13 PROCEDURE — 99215 PR OFFICE/OUTPT VISIT, EST, LEVL V, 40-54 MIN: ICD-10-PCS | Mod: ,,, | Performed by: STUDENT IN AN ORGANIZED HEALTH CARE EDUCATION/TRAINING PROGRAM

## 2023-07-13 PROCEDURE — 99215 OFFICE O/P EST HI 40 MIN: CPT | Mod: ,,, | Performed by: STUDENT IN AN ORGANIZED HEALTH CARE EDUCATION/TRAINING PROGRAM

## 2023-07-13 RX ORDER — HEPARIN 100 UNIT/ML
500 SYRINGE INTRAVENOUS
Status: CANCELLED | OUTPATIENT
Start: 2023-07-13

## 2023-07-13 RX ORDER — LEVOTHYROXINE SODIUM 125 UG/1
125 TABLET ORAL DAILY
Qty: 30 TABLET | Refills: 11 | Status: SHIPPED | OUTPATIENT
Start: 2023-07-13 | End: 2023-07-26

## 2023-07-13 RX ORDER — SODIUM CHLORIDE 0.9 % (FLUSH) 0.9 %
10 SYRINGE (ML) INJECTION
Status: CANCELLED | OUTPATIENT
Start: 2023-07-13

## 2023-07-13 NOTE — PROGRESS NOTES
Referring provider Dr. Henlsey  Reason for consult:  Urothelial carcinoma    Diagnosis:  - Urothelial ca pT3 pNxMX, at least stage III     Treatment History:  -Nephro ureterectomy on 23rd July 2022. Refused chemo.     Current Treatment:   -  Started with  adjuvant Nivolumab  on 09/21/2022.    HPI  Patient is a 76-year-old with history of hypertension and BPH who presented to the ER in May 2022 with symptoms of hematuria.  A CT at that point revealed an obstructing mass in the right renal pelvis for which he was seen by Dr. Hensley and underwent a diagnostic ureteroscopy, pathology from which revealed urothelial carcinoma.  Patient underwent a right ureteronephrectomy on 23rd July 2022.  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  Patient presented to clinic to establish care to discuss further disease management.    Patient was started on adjuvant nivolumab on 09/21/2022.    Patient is a current smoker, 25 pack year smoking history, denies alcohol or recreational drug use.  He currently works part-time, [employer].  Denies any family history of malignancy.  Lives with his wife.  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      INTERVAL HISTORY:   Today, 07/13/2023, patient denies any acute concerns today.  He reports tolerating his last cycle of treatment well without significant side effects.  He denies any new lumps or bumps, decreased appetite or weight loss.  He does report intermittent diarrhea which resolves without intervention.  He reports fatigue which she attributes to hypothyroidism.  He denies any new medications, ER or hospital visits.      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  Margins free of tumor,  lymphovascular invasion not seen.  No regional lymph nodes submitted.  PT3 pNX pGlow-grade    Imaging         May 2022:  CT abdomen pelvis with contrast:  The filling defect in the right renal pelvis is most suggestive of a mass with neoplasm a concern.  This lesion also produces relatively delay in contrast opacification of the right kidney indicating partial urinary obstruction.    09/06/2022 CT chest abdomen pelvis with IV contrast:  No acute pulmonary disease, adenopathy or suspicious pulmonary nodules identified.  Is suspected cyst is visible in the right thyroid lobe, evaluation with dedicated ultrasound to be considered.  Status post right nephrectomy and apparent surgery involving the right lateral wall of the urinary bladder, no gross mucosal lesions visible in the bladder although there is relative wall thickening identified inferiorly and posteriorly.    05/16/2023 CT chest abdomen pelvis with contrast:  No evidence of neoplasm in the chest, small pericardial effusion stable.  Tiny hepatic cysts, prior right nephrectomy, atherosclerotic and degenerative changes, small umbilical hernia      LAB:   07/12/2023:  TSH 0.2715, creatinine 1.6, albumin 3.8, calcium 9.5, LFTs within normal limits, magnesium 2.1, phosphorus 3.1, , WBC count 8.3, hemoglobin 14.5, .2, platelet count 2 1 1, ANC 5.52.  06/14/2023:  TSH 0.6, albumin 3.9, calcium 9.8, LFTs within normal limits, cortisol 8.9, creatinine 1.54, WBC count 9.01, hemoglobin 15.1, .3, platelet count 187, ANC 6.12.  05/17/2023:  TSH 0.6, free T4 1.2, albumin 3.9, calcium 9.4, LFTs within normal limits, cortisol 12, creatinine 1.48, WBC count 8.01, hemoglobin 14.9, .7, platelet count 188, ANC 4.91.  04/13/23: TSH 5.1882, T4 1.24, cr 1.56, alb 3.9, ca 9.53, LFTs WNL, wbc 9.75, hgb 14.8, plt 197, ANC 6.53  03/16/2023:  Hemoglobin 15.3, hematocrit 47%, WBC 10k platelets 173 K.  BUN 27 creatinine 1.84.  TSH is still very high 89  uI/ml  03.08/23: Cr 1.93, ca 9.52, alb 4.1, LFTs WNL, cortisol 8.6, TSH 79.73 T4 .50, wbc 9.08, hgb 14.9, plt 185, ANC 6.04  02/08/2023:  Creatinine 1.8, albumin 4.2, calcium 9.4, total bili 0.9, AST 18, ALT 15, TSH 25, cortisol 9.6, WBC 9.3, hemoglobin 14.8, .1, platelet count 192.  ANC 6.3.  Magnesium 2.1, phosphorus 3.3.  01/11/23: Cr 1.70, K+ 5.23, mag 2.1, phosphorous 4.7, LFTs WNL, TSH 98.9, Free T4 0.53, wbc 10.84, hgb 14.6, plt 190, ANC 7.48  12/14/2022-potassium 5.3, creatinine 1.74 WBC 10.05, hemoglobin 14.6, platelets 188, TSH 92.608, free T4 less than 0.42  11/16/2022:  Creatinine 1.6, albumin 3.8, calcium 9.5, alkaline phosphatase 100, total bili 0.4, AST 16, ALT 20, TSH 0.020, free T4 pending, WBC count 9.6, hemoglobin 13.7, .5, platelet count 202, ANC 6.36.     10/19/2022:  Creatinine 1.64, albumin 3.8, alkaline phosphatase 110, LFTs within normal limits, TSH 0.039, cortisol 4.9, WBC count 7.6, hemoglobin 13.6, .3, platelet count 191 000, ANC 4.75.    09/02/2022:  Creatinine 1.44, albumin 4.0, alkaline phosphatase 98, total bili 0.7, AST 18, ALT 18, WBC count 9.06, hemoglobin 14.5, platelet count 173.  ANC 5.70.     Past Medical History:   Diagnosis Date    Bladder cancer     Enlarged prostate     Hyperlipidemia     Hypertension     Urothelial carcinoma of kidney, right        Past Surgical History:   Procedure Laterality Date    KNEE SURGERY      LAPAROSCOPIC ROBOT-ASSISTED SURGICAL REMOVAL OF KIDNEY USING DA JAZMIN XI  07/20/2022    SHOULDER SURGERY         Family History   Problem Relation Age of Onset    Heart disease Mother     Heart disease Father     Heart disease Sister        Social History     Socioeconomic History    Marital status:    Tobacco Use    Smoking status: Smoker, Current Status Unknown     Packs/day: 0.50     Types: Cigarettes    Smokeless tobacco: Never   Substance and Sexual Activity    Alcohol use: Not Currently    Drug use: Never       Current  Outpatient Medications   Medication Sig Dispense Refill    aspirin (ECOTRIN) 81 MG EC tablet Take 81 mg by mouth once daily.      atorvastatin (LIPITOR) 20 MG tablet Take 20 mg by mouth once daily.      benazepriL (LOTENSIN) 40 MG tablet Take 1 tablet (40 mg total) by mouth once daily. 90 tablet 1    diclofenac sodium (VOLTAREN) 1 % Gel Apply topically.      finasteride (PROSCAR) 5 mg tablet Take 5 mg by mouth once daily.      HYDROcodone-acetaminophen (NORCO) 7.5-325 mg per tablet Take 1 tablet by mouth every 8 (eight) hours as needed.      multivitamin (THERAGRAN) per tablet Take 1 tablet by mouth once daily.      tamsulosin (FLOMAX) 0.4 mg Cap Take 1 capsule (0.4 mg total) by mouth once daily. 90 capsule 1    levothyroxine (SYNTHROID) 125 MCG tablet Take 1 tablet (125 mcg total) by mouth once daily. 30 tablet 11     No current facility-administered medications for this visit.       Review of patient's allergies indicates:   Allergen Reactions    Pravachol [pravastatin]        CONSTITUTIONAL: no fevers, no chills, no weight loss, + fatigue, no weakness  HEMATOLOGIC: no abnormal bleeding, no abnormal bruising, no drenching night sweats  ONCOLOGIC: no new masses or lumps  HEENT: no vision loss, no tinnitus or hearing loss, no nose bleeding, no dysphagia, no odynophagia  CVS: no chest pain, no palpitations, no dyspnea on exertion  RESP: no shortness of breath, no hemoptysis, no cough  GI: no nausea, no vomiting, no diarrhea, no constipation, no melena, no hematochezia, no hematemesis, no abdominal pain, no increase in abdominal girth  : no dysuria, no hematuria, no hesitancy, no scrotal swelling, no discharge  INTEGUMENT: no rashes, no abnormal bruising, no nail pitting, no hyperpigmentation  NEURO: no falls, no memory loss, no paresthesias or dysesthesias, no urofecal incontinence or retention, no loss of strength on any extremity  MSK: no back pain, no new joint pain, no joint swelling  PSYCH: no suicidal or  "homicidal ideation, no depression, no insomnia, no anhedonia  ENDOCRINE: no heat or cold intolerance, no polyuria, no polydipsia       OBJECTIVE   Blood pressure 136/78, pulse 75, temperature 98.5 °F (36.9 °C), temperature source Oral, resp. rate 18, height 5' 9" (1.753 m), weight 87.1 kg (192 lb), SpO2 (!) 94 %.   Physical Exam:  ECOG PS 0  GA: AAOx3, NAD  HEENT: NCAT, good dentition, moist oral mucous membranes  LYMPH: no cervical, axillary or supraclavicular adenopathy  CVS: s1s2 RRR, no M/R/G  RESP: CTA b/l, no crackles, no wheezes or rhonchi  ABD: soft, NT, ND, BS+, no hepatosplenomegaly  EXT: no deformities, no pedal edema  SKIN: no rashes, warm and dry  NEURO: normal mentation, strength 5/5 on all 4 extremities, no sensory deficits      Assessment:       # Urothelial carcinoma, right, upper ureter, low-grade, pT3 pNxMX, at least stage III  Negative margins, negative for lymphovascular invasion.    Of note patient had a diagnosis of non-muscle invasive bladder cancer in the past treated with resection followed by intravesical therapy.  Discussed with patient the diagnosis, staging and treatment recommendations including adjuvant therapy in the setting of T3 disease.    Patient is a relatively fit, currently works part-time.  He does have mild hearing loss.  Denies any symptoms of neuropathy.    Discussed the recommendations to undergo adjuvant platinum-based chemotherapy given T3 disease after completion of staging workup.     Patient is hesitant to consider chemotherapy given his experience with his family members going through it.    Noted repeat CT chest abdomen pelvis IV contrast on 09/06/2022 without evidence of metastatic disease   Recommend adjuvant chemotherapy with gem cis.  Discussed the category 2B recommendation for nivolumab in adjuvant setting.  Patient would like to think about his options prior to making a decision.   He called our office to let us know that he would like to proceed with " adjuvant nivolumab.    Initiated adjuvant nivolumab on 09/21/2022..  Patient missed 1 dose of nivolumab in between given issues with thyroid function  Reviewed CT chest abdomen pelvis with contrast from May 2023 without evidence of disease      #Hypothyroidism  - noted very high TSH of 92.6 with free T4 of less than 0.42 while during previous visit was noted to have very low TSH of 0.020 (11/2022) & 0.039 (10/2022). Previously 2.83 (09/2022).  - this could be initially because of the thyroiditis after being started on autoimmune disease and now developed hypothyroidism.  Thus will start the patient on low-dose of levothyroxine for now and referred to endocrinology for further evaluation  - Hypothyroidism currently being treated by Dr Aguilar, endocrinologist.   - Levothyroxine increased from 100 mcg to 150 mcg, noted low TSH, will decrease Synthroid 125 mcg per day.  Patient has a follow-up with endocrinology this month in July 2023.    Plan:     Reviewed labs, Proceed with next cycle of nivolumab today  Reviewed labs,  Patient was advised to decrease Synthroid to 125 mcg q.day  Continue with Dr. 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  Has a follow-up scheduled in July   Patient was advised to follow-up with PCP due to findings of hyperkalemia on blood work likely secondary to ACE inhibitor use  RTC 4 weeks w/ CBC, CMP TSH, T4, cortisol  Plan is for total 1 year of adjuvant nivolumab. (Started 9/21/22)           Portions of the record may have been created with voice recognition software. Occasional wrong-word or sound-a-like substitutions may have occurred due to the inherent limitations of voice recognition software. Read the chart carefully and recognize, using context, where substitutions have occurred.

## 2023-07-16 ENCOUNTER — TELEPHONE (OUTPATIENT)
Dept: FAMILY MEDICINE | Facility: CLINIC | Age: 78
End: 2023-07-16
Payer: MEDICARE

## 2023-07-16 RX ORDER — ALBUTEROL SULFATE 0.83 MG/ML
2.5 SOLUTION RESPIRATORY (INHALATION) 4 TIMES DAILY PRN
COMMUNITY

## 2023-07-25 PROBLEM — N62 GYNECOMASTIA: Status: ACTIVE | Noted: 2023-07-25

## 2023-07-25 PROBLEM — E03.9 HYPOTHYROIDISM: Status: ACTIVE | Noted: 2023-07-25

## 2023-07-25 PROBLEM — N40.0 BPH (BENIGN PROSTATIC HYPERPLASIA): Status: ACTIVE | Noted: 2023-07-25

## 2023-07-25 PROBLEM — E55.9 VITAMIN D DEFICIENCY: Status: ACTIVE | Noted: 2023-07-25

## 2023-07-25 PROBLEM — E78.5 HYPERLIPIDEMIA: Status: ACTIVE | Noted: 2023-07-25

## 2023-07-25 PROBLEM — J44.9 COPD (CHRONIC OBSTRUCTIVE PULMONARY DISEASE): Status: ACTIVE | Noted: 2023-07-25

## 2023-07-25 PROBLEM — I10 HYPERTENSION: Status: ACTIVE | Noted: 2023-07-25

## 2023-07-25 PROBLEM — C64.1 UROTHELIAL CARCINOMA OF KIDNEY, RIGHT: Status: ACTIVE | Noted: 2023-07-25

## 2023-07-25 PROBLEM — F17.200 SMOKER: Status: ACTIVE | Noted: 2023-07-25

## 2023-07-25 PROBLEM — N20.0 KIDNEY STONES: Status: ACTIVE | Noted: 2023-07-25

## 2023-07-25 PROBLEM — I77.9 CAROTID ARTERIAL DISEASE: Status: ACTIVE | Noted: 2023-07-25

## 2023-07-26 ENCOUNTER — OFFICE VISIT (OUTPATIENT)
Dept: FAMILY MEDICINE | Facility: CLINIC | Age: 78
End: 2023-07-26
Payer: MEDICARE

## 2023-07-26 VITALS
SYSTOLIC BLOOD PRESSURE: 122 MMHG | RESPIRATION RATE: 16 BRPM | HEIGHT: 68 IN | WEIGHT: 192.38 LBS | DIASTOLIC BLOOD PRESSURE: 78 MMHG | TEMPERATURE: 98 F | BODY MASS INDEX: 29.16 KG/M2 | OXYGEN SATURATION: 97 % | HEART RATE: 72 BPM

## 2023-07-26 DIAGNOSIS — Z79.899 ENCOUNTER FOR LONG-TERM (CURRENT) USE OF OTHER MEDICATIONS: ICD-10-CM

## 2023-07-26 DIAGNOSIS — I10 PRIMARY HYPERTENSION: Primary | ICD-10-CM

## 2023-07-26 DIAGNOSIS — N18.32 STAGE 3B CHRONIC KIDNEY DISEASE: ICD-10-CM

## 2023-07-26 PROBLEM — N18.9 CHRONIC KIDNEY DISEASE: Status: ACTIVE | Noted: 2023-07-26

## 2023-07-26 PROCEDURE — 99213 OFFICE O/P EST LOW 20 MIN: CPT | Mod: ,,, | Performed by: FAMILY MEDICINE

## 2023-07-26 PROCEDURE — 99213 PR OFFICE/OUTPT VISIT, EST, LEVL III, 20-29 MIN: ICD-10-PCS | Mod: ,,, | Performed by: FAMILY MEDICINE

## 2023-07-26 RX ORDER — LEVOTHYROXINE SODIUM 137 UG/1
125 TABLET ORAL DAILY
COMMUNITY
Start: 2023-07-21

## 2023-07-26 NOTE — PROGRESS NOTES
Patient Name: Moise Rosado     Patient ID: [id number]     Chief Complaint: Results (Go over results.)      HPI:     Moise Rosado is a 77 y.o. male here today for lab work results ordered per Dr. Montana. Reviewed and discussed lab work results.    Past Medical History:   Diagnosis Date    Bladder cancer 2004    tumor removed    BPH (benign prostatic hyperplasia)     Carotid arterial disease     Carotid bruit     COPD (chronic obstructive pulmonary disease)     Enlarged prostate     Gynecomastia     Hyperlipidemia     Hypertension     Kidney stones     Mixed hyperlipidemia     Smoker     Urothelial carcinoma of kidney, right     Vitamin D deficiency         Past Surgical History:   Procedure Laterality Date    KNEE SURGERY      LAPAROSCOPIC ROBOT-ASSISTED SURGICAL REMOVAL OF KIDNEY USING DA JAZMIN XI  07/20/2022    SHOULDER SURGERY          Social History     Socioeconomic History    Marital status:     Number of children: 2   Tobacco Use    Smoking status: Every Day     Packs/day: 0.50     Types: Cigarettes    Smokeless tobacco: Never   Substance and Sexual Activity    Alcohol use: Never    Drug use: Never    Sexual activity: Not Currently        Current Outpatient Medications   Medication Instructions    albuterol (PROVENTIL) 2.5 mg, Nebulization, 4 times daily PRN, Rescue    aspirin (ECOTRIN) 81 mg, Oral, Daily    atorvastatin (LIPITOR) 20 mg, Oral, Daily    benazepriL (LOTENSIN) 40 mg, Oral, Daily    diclofenac sodium (VOLTAREN) 1 % Gel Topical (Top)    finasteride (PROSCAR) 5 mg, Oral, Daily    levothyroxine (SYNTHROID) 150 mcg, Oral, Daily    multivitamin (THERAGRAN) per tablet 1 tablet, Oral, Daily    tamsulosin (FLOMAX) 0.4 mg, Oral, Daily       Review of patient's allergies indicates:   Allergen Reactions    Pravachol [pravastatin]           There is no immunization history on file for this patient.    Patient Care Team:  Dallas Iverson Sr., MD as PCP - General (Family Medicine)  Jed  "LELE Hensley MD as Consulting Physician (Urology)  Rohan Aguilar MD as Consulting Physician (Endocrinology)  Audelia Montana MD as Consulting Physician (Oncology)     Subjective:     Review of Systems    10 point review of systems conducted, negative except as stated in the history of present illness. See HPI for details.    Objective:     Visit Vitals  /78 (BP Location: Left arm, Patient Position: Sitting, BP Method: Medium (Manual))   Pulse 72   Temp 97.6 °F (36.4 °C)   Resp 16   Ht 5' 8" (1.727 m)   Wt 87.3 kg (192 lb 6.4 oz)   SpO2 97%   BMI 29.25 kg/m²       Physical Exam  Constitutional:       Appearance: Normal appearance.   HENT:      Head: Normocephalic and atraumatic.   Cardiovascular:      Rate and Rhythm: Normal rate and regular rhythm.      Heart sounds: Normal heart sounds.   Pulmonary:      Effort: Pulmonary effort is normal.      Breath sounds: Normal breath sounds.   Abdominal:      Palpations: Abdomen is soft.      Tenderness: There is no abdominal tenderness.   Musculoskeletal:         General: No swelling or tenderness. Normal range of motion.      Cervical back: Normal range of motion and neck supple.      Right lower leg: No edema.      Left lower leg: No edema.   Lymphadenopathy:      Cervical: No cervical adenopathy.   Skin:     General: Skin is warm and dry.   Neurological:      General: No focal deficit present.      Mental Status: He is alert and oriented to person, place, and time.   Psychiatric:         Mood and Affect: Mood normal.         Assessment:       ICD-10-CM ICD-9-CM   1. Primary hypertension  I10 401.9   2. Stage 3b chronic kidney disease  N18.32 585.3   3. Encounter for long-term (current) use of other medications  Z79.899 V58.69        Plan:     1. Primary hypertension  Overview:  Continue with Aspirin 81 mg daily + Benazepril 40 mg daily.  Patient is well controlled.  Low Sodium Diet (DASH Diet - Less than 2 grams of sodium per day).  Monitor blood pressure daily and " log. Report consistent numbers greater than 140/90.  Maintain healthy weight with goal BMI <30. Exercise 30 minutes per day, 5 days per week.    Orders:  -     CBC Auto Differential; Future; Expected date: 01/26/2024    2. Stage 3b chronic kidney disease  Overview:  Stable from renal standpoint.  Follow renoprotective measures including Renal Diet (reduce intake of nuts, peanut butter, milk, cheese, dried beans, peas) and Low Sodium Diet (less than 2 grams per day).  Avoid NSAIDs (Aleve, Mobic, Celebrex, Ibuprofen, Advil, Toradol and Diclofenac). May take Tylenol as needed for headache/pain.  Control DM with goal A1C <7. BP goal <130/80. LDL goal < 100.  Stay well hydrated. Avoid alcohol and soda. Limit tea and coffee.  His BUN  &  Creat are 25.68 & 1.60.  Electrolytes sodium, potassium and calcium are within normal limits.      3. Encounter for long-term (current) use of other medications  Overview:  Patient was instructed to continue with the prescribed medications as no adverse or cost issues were found.   Refills were given if needed.  Compliance issues were discussed as far as medications that patient is currently taking.  Discussed the possibility of getting off some medications that were not absolutely necessary.    Orders:  -     Comprehensive Metabolic Panel; Future; Expected date: 01/26/2024  -     Lipid Panel; Future; Expected date: 01/26/2024  -     TSH; Future; Expected date: 01/26/2024  -     CK; Future; Expected date: 01/26/2024        [x] Discussed lab findings with the patient.  [x] Discussed diet, exercise and if appropriate, weight loss.  [] Instructions and information, including risks and benefits of prescribed medication(s) have been reviewed with the patient and patient verbalizes understanding. Questions have been answered to the patient's satisfaction.  [] Appropriate counseling has been given regarding anxiety and depressive issues that were discussed today.  [] Any lab drawn today will be  reviewed by physician at the time it is received and appropriate recommendations bill be made and discussed with patient.     Follow up in about 6 months (around 1/26/2024) for Follow Up, With Fasting Labs prior to visit.   In addition to their scheduled follow up, the patient has also been instructed to follow up on as needed basis.     Future Appointments   Date Time Provider Department Center   8/10/2023  1:00 PM Hanh Munoz NP [facility]   1/18/2024  8:00 AM MD OSWALD Spivey Sr., Sr, MD

## 2023-08-10 ENCOUNTER — OFFICE VISIT (OUTPATIENT)
Dept: HEMATOLOGY/ONCOLOGY | Facility: CLINIC | Age: 78
End: 2023-08-10
Payer: MEDICARE

## 2023-08-10 VITALS
OXYGEN SATURATION: 98 % | SYSTOLIC BLOOD PRESSURE: 127 MMHG | DIASTOLIC BLOOD PRESSURE: 80 MMHG | WEIGHT: 193 LBS | BODY MASS INDEX: 29.25 KG/M2 | HEIGHT: 68 IN | HEART RATE: 77 BPM | RESPIRATION RATE: 20 BRPM | TEMPERATURE: 98 F

## 2023-08-10 DIAGNOSIS — C68.9 UROTHELIAL CARCINOMA: Primary | ICD-10-CM

## 2023-08-10 PROCEDURE — 99215 OFFICE O/P EST HI 40 MIN: CPT | Mod: ,,,

## 2023-08-10 PROCEDURE — 99215 PR OFFICE/OUTPT VISIT, EST, LEVL V, 40-54 MIN: ICD-10-PCS | Mod: ,,,

## 2023-08-10 RX ORDER — SODIUM CHLORIDE 0.9 % (FLUSH) 0.9 %
10 SYRINGE (ML) INJECTION
Status: CANCELLED | OUTPATIENT
Start: 2023-08-10

## 2023-08-10 RX ORDER — HEPARIN 100 UNIT/ML
500 SYRINGE INTRAVENOUS
Status: CANCELLED | OUTPATIENT
Start: 2023-08-10

## 2023-08-10 NOTE — PROGRESS NOTES
Referring provider Dr. Hensley  Reason for consult:  Urothelial carcinoma    Diagnosis:  - Urothelial ca pT3 pNxMX, at least stage III     Treatment History:  -Nephro ureterectomy on 23rd July 2022. Refused chemo.     Current Treatment:   -  Started with  adjuvant Nivolumab  on 09/21/2022.    HPI  Patient is a 76-year-old with history of hypertension and BPH who presented to the ER in May 2022 with symptoms of hematuria.  A CT at that point revealed an obstructing mass in the right renal pelvis for which he was seen by Dr. Hensley and underwent a diagnostic ureteroscopy, pathology from which revealed urothelial carcinoma.  Patient underwent a right ureteronephrectomy on 23rd July 2022.  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  Patient presented to clinic to establish care to discuss further disease management.    Patient was started on adjuvant nivolumab on 09/21/2022.    INTERVAL HISTORY:   Today, 08/10/23 Patient states that is feeling well overall. He saw Dr. Pierre 2 weeks ago and states his synthroid dosage was adjusted. He tolerated his last treatment well. He states diarrhea has resolved. Denies shortness of breath, cough, chest pain. He denies unintentional weight loss, night sweats, any lumps or bumps. His appetite is fair. Mild fatigue that is unchanged, he continues to work.     Patient is a current smoker, 25 pack year smoking history, denies alcohol or recreational drug use.  He currently works part-time, [employer].  Denies any family history of malignancy.  Lives with his wife.  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  Margins free of tumor,  lymphovascular invasion not seen.  No regional lymph nodes submitted.  PT3 pNX pGlow-grade    Imaging         May 2022:  CT abdomen pelvis with contrast:  The filling defect in the right renal pelvis is most suggestive of a mass with neoplasm a concern.  This lesion also produces relatively delay in contrast opacification of the right kidney indicating partial urinary obstruction.    09/06/2022 CT chest abdomen pelvis with IV contrast:  No acute pulmonary disease, adenopathy or suspicious pulmonary nodules identified.  Is suspected cyst is visible in the right thyroid lobe, evaluation with dedicated ultrasound to be considered.  Status post right nephrectomy and apparent surgery involving the right lateral wall of the urinary bladder, no gross mucosal lesions visible in the bladder although there is relative wall thickening identified inferiorly and posteriorly.    05/16/2023 CT chest abdomen pelvis with contrast:  No evidence of neoplasm in the chest, small pericardial effusion stable.  Tiny hepatic cysts, prior right nephrectomy, atherosclerotic and degenerative changes, small umbilical hernia      LAB:   08/09/2023: TSH 0.2418, alb 4.0, ca 9.48, LFTs WNL, cortisol 8.1, wbc 8.89, hgb 14.8, plt 209, ANC 5.54  06/14/2023:  TSH 0.6, albumin 3.9, calcium 9.8, LFTs within normal limits, cortisol 8.9, creatinine 1.54, WBC count 9.01, hemoglobin 15.1, .3, platelet count 187, ANC 6.12.  05/17/2023:  TSH 0.6, free T4 1.2, albumin 3.9, calcium 9.4, LFTs within normal limits, cortisol 12, creatinine 1.48, WBC count 8.01, hemoglobin 14.9, .7, platelet count 188, ANC 4.91.  04/13/23: TSH 5.1882, T4 1.24, cr 1.56, alb 3.9, ca 9.53, LFTs WNL, wbc 9.75, hgb 14.8, plt 197, ANC 6.53  03/16/2023:  Hemoglobin 15.3, hematocrit 47%, WBC 10k platelets 173 K.  BUN 27 creatinine 1.84.  TSH is still very high 89 uI/ml  03.08/23: Cr 1.93, ca 9.52, alb 4.1, LFTs WNL, cortisol 8.6, TSH 79.73 T4 .50, wbc 9.08, hgb 14.9, plt 185,  ANC 6.04  02/08/2023:  Creatinine 1.8, albumin 4.2, calcium 9.4, total bili 0.9, AST 18, ALT 15, TSH 25, cortisol 9.6, WBC 9.3, hemoglobin 14.8, .1, platelet count 192.  ANC 6.3.  Magnesium 2.1, phosphorus 3.3.  01/11/23: Cr 1.70, K+ 5.23, mag 2.1, phosphorous 4.7, LFTs WNL, TSH 98.9, Free T4 0.53, wbc 10.84, hgb 14.6, plt 190, ANC 7.48  12/14/2022-potassium 5.3, creatinine 1.74 WBC 10.05, hemoglobin 14.6, platelets 188, TSH 92.608, free T4 less than 0.42  11/16/2022:  Creatinine 1.6, albumin 3.8, calcium 9.5, alkaline phosphatase 100, total bili 0.4, AST 16, ALT 20, TSH 0.020, free T4 pending, WBC count 9.6, hemoglobin 13.7, .5, platelet count 202, ANC 6.36.     10/19/2022:  Creatinine 1.64, albumin 3.8, alkaline phosphatase 110, LFTs within normal limits, TSH 0.039, cortisol 4.9, WBC count 7.6, hemoglobin 13.6, .3, platelet count 191 000, ANC 4.75.    09/02/2022:  Creatinine 1.44, albumin 4.0, alkaline phosphatase 98, total bili 0.7, AST 18, ALT 18, WBC count 9.06, hemoglobin 14.5, platelet count 173.  ANC 5.70.     Past Medical History:   Diagnosis Date    Bladder cancer 2004    tumor removed    BPH (benign prostatic hyperplasia)     Carotid arterial disease     Carotid bruit     COPD (chronic obstructive pulmonary disease)     Enlarged prostate     Gynecomastia     Hyperlipidemia     Hypertension     Kidney stones     Mixed hyperlipidemia     Smoker     Urothelial carcinoma of kidney, right     Vitamin D deficiency        Past Surgical History:   Procedure Laterality Date    KNEE SURGERY      LAPAROSCOPIC ROBOT-ASSISTED SURGICAL REMOVAL OF KIDNEY USING DA JAZMIN XI  07/20/2022    SHOULDER SURGERY         Family History   Problem Relation Age of Onset    Heart disease Mother     Heart disease Father     Emphysema Father     Heart disease Sister     Diabetes Paternal Grandmother        Social History     Socioeconomic History    Marital status:     Number of children: 2   Tobacco Use     Smoking status: Every Day     Current packs/day: 0.50     Types: Cigarettes    Smokeless tobacco: Never   Substance and Sexual Activity    Alcohol use: Never    Drug use: Never    Sexual activity: Not Currently       Current Outpatient Medications   Medication Sig Dispense Refill    albuterol (PROVENTIL) 2.5 mg /3 mL (0.083 %) nebulizer solution Take 2.5 mg by nebulization 4 (four) times daily as needed (up to 4 times daily as neeeded for couch, wheezing or for shortness of breath). Rescue      aspirin (ECOTRIN) 81 MG EC tablet Take 81 mg by mouth once daily.      atorvastatin (LIPITOR) 20 MG tablet Take 20 mg by mouth once daily.      benazepriL (LOTENSIN) 40 MG tablet Take 1 tablet (40 mg total) by mouth once daily. 90 tablet 1    diclofenac sodium (VOLTAREN) 1 % Gel Apply topically.      finasteride (PROSCAR) 5 mg tablet Take 5 mg by mouth once daily.      levothyroxine (SYNTHROID) 150 MCG tablet Take 150 mcg by mouth once daily.      multivitamin (THERAGRAN) per tablet Take 1 tablet by mouth once daily.      tamsulosin (FLOMAX) 0.4 mg Cap Take 1 capsule (0.4 mg total) by mouth once daily. 90 capsule 1     No current facility-administered medications for this visit.       Review of patient's allergies indicates:   Allergen Reactions    Pravachol [pravastatin]        Review of Systems   Constitutional:  Positive for malaise/fatigue. Negative for chills and fever.   HENT: Negative.     Eyes: Negative.    Respiratory: Negative.  Negative for cough and shortness of breath.    Cardiovascular: Negative.  Negative for chest pain.   Gastrointestinal:  Positive for abdominal pain, diarrhea and nausea. Negative for vomiting.   Genitourinary: Negative.    Musculoskeletal: Negative.    Skin: Negative.  Negative for itching and rash.   Neurological: Negative.  Negative for focal weakness.   Endo/Heme/Allergies: Negative.    Psychiatric/Behavioral: Negative.     All other systems reviewed and are negative.       OBJECTIVE  "  Blood pressure 127/80, pulse 77, temperature 98 °F (36.7 °C), temperature source Oral, resp. rate 20, height 5' 8" (1.727 m), weight 87.5 kg (193 lb), SpO2 98 %.     Physical Exam  HENT:      Head: Normocephalic.      Right Ear: External ear normal.      Left Ear: External ear normal.   Eyes:      Pupils: Pupils are equal, round, and reactive to light.   Cardiovascular:      Rate and Rhythm: Normal rate.   Pulmonary:      Effort: Pulmonary effort is normal.      Breath sounds: Normal breath sounds.   Abdominal:      General: Bowel sounds are normal.      Palpations: Abdomen is soft.   Musculoskeletal:      Cervical back: Neck supple.   Skin:     General: Skin is warm.   Neurological:      Mental Status: He is alert and oriented to person, place, and time.   Psychiatric:         Mood and Affect: Mood and affect normal.         Assessment:       # Urothelial carcinoma, right, upper ureter, low-grade, pT3 pNxMX, at least stage III  Negative margins, negative for lymphovascular invasion.    Of note patient had a diagnosis of non-muscle invasive bladder cancer in the past treated with resection followed by intravesical therapy.  Discussed with patient the diagnosis, staging and treatment recommendations including adjuvant therapy in the setting of T3 disease.    Patient is a relatively fit, currently works part-time.  He does have mild hearing loss.  Denies any symptoms of neuropathy.    Discussed the recommendations to undergo adjuvant platinum-based chemotherapy given T3 disease after completion of staging workup.     Patient is hesitant to consider chemotherapy given his experience with his family members going through it.    Noted repeat CT chest abdomen pelvis IV contrast on 09/06/2022 without evidence of metastatic disease   Recommend adjuvant chemotherapy with gem cis.  Discussed the category 2B recommendation for nivolumab in adjuvant setting.  Patient would like to think about his options prior to making a " decision.   He called our office to let us know that he would like to proceed with adjuvant nivolumab.    Initiated adjuvant nivolumab on 09/21/2022..  Patient missed 1 dose of nivolumab in between given issues with thyroid function  Reviewed CT chest abdomen pelvis with contrast from May 2023 without evidence of disease      #Hypothyroidism  - noted very high TSH of 92.6 with free T4 of less than 0.42 while during previous visit was noted to have very low TSH of 0.020 (11/2022) & 0.039 (10/2022). Previously 2.83 (09/2022).  - this could be initially because of the thyroiditis after being started on autoimmune disease and now developed hypothyroidism.  Thus will start the patient on low-dose of levothyroxine for now and referred to endocrinology for further evaluation  - cortisol 6.6 (11/2022)  - Hypothyroidism currently being treated by Dr Aguilar, endocrinologist.   - Now Levothyroxine dose adjusted to 150 mcg X 6 days and 75 mcg on day 7 weekly until next TSH level rechecked 11/2023 per Dr. Aguilar.    Plan:     Proceed with next cycle 11/12 of nivolumab today.  Reviewed labs, continue taking synthroid 150 mcg X 6 days and 75 mcg on day 7.  Will given 1/2 L Normal saline today, potassium 5.31 BUN 27.29   Continue with Dr. 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    Will forward today's TSH level 0.2418 to Dr. Aguilar office. Synthroid recently adjusted as noted above.  RTC 4 weeks for 12/12 Nivolumab w/ CBC, CMP TSH, T4, cortisol  Will plan to repeat CT CAP 4 weeks after cycle 12.  Plan is for total 1 year of adjuvant nivolumab. (Started 9/21/22)       Pt instructed to call in the interim for any new or worsening concerns or symptoms

## 2023-08-17 DIAGNOSIS — E78.5 HYPERLIPIDEMIA, UNSPECIFIED HYPERLIPIDEMIA TYPE: Primary | ICD-10-CM

## 2023-08-17 RX ORDER — ATORVASTATIN CALCIUM 20 MG/1
20 TABLET, FILM COATED ORAL DAILY
Qty: 90 TABLET | Refills: 2 | Status: SHIPPED | OUTPATIENT
Start: 2023-08-17

## 2023-09-06 NOTE — PROGRESS NOTES
Referring provider Dr. Hensley  Reason for consult:  Urothelial carcinoma    Diagnosis:  - Urothelial ca pT3 pNxMX, at least stage III     Treatment History:  -Nephro ureterectomy on 23rd July 2022. Refused chemo.     Current Treatment:   -  Started with  adjuvant Nivolumab  on 09/21/2022.    HPI  Patient is a 76-year-old with history of hypertension and BPH who presented to the ER in May 2022 with symptoms of hematuria.  A CT at that point revealed an obstructing mass in the right renal pelvis for which he was seen by Dr. Hensley and underwent a diagnostic ureteroscopy, pathology from which revealed urothelial carcinoma.  Patient underwent a right ureteronephrectomy on 23rd July 2022.  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  Patient presented to clinic to establish care to discuss further disease management.    Patient was started on adjuvant nivolumab on 09/21/2022.    INTERVAL HISTORY:   Today, 09/07/23 Patient states he has increased loose bowels since last visit. He reports 2-3 BM's 3-4 days of the week. He has increased oral hydration prevent dehydration. He will complete nivolumab 12/12 cycle today. Denies shortness of breath, cough, chest pain. He denies unintentional weight loss, night sweats, any lumps or bumps. His appetite is fair. Mild fatigue that is unchanged.    Patient is a current smoker, 25 pack year smoking history, denies alcohol or recreational drug use.  He currently works part-time, [employer].  Denies any family history of malignancy.  Lives with his wife.  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  Margins free of tumor, lymphovascular  invasion not seen.  No regional lymph nodes submitted.  PT3 pNX pGlow-grade    Imaging         May 2022:  CT abdomen pelvis with contrast:  The filling defect in the right renal pelvis is most suggestive of a mass with neoplasm a concern.  This lesion also produces relatively delay in contrast opacification of the right kidney indicating partial urinary obstruction.    09/06/2022 CT chest abdomen pelvis with IV contrast:  No acute pulmonary disease, adenopathy or suspicious pulmonary nodules identified.  Is suspected cyst is visible in the right thyroid lobe, evaluation with dedicated ultrasound to be considered.  Status post right nephrectomy and apparent surgery involving the right lateral wall of the urinary bladder, no gross mucosal lesions visible in the bladder although there is relative wall thickening identified inferiorly and posteriorly.    05/16/2023 CT chest abdomen pelvis with contrast:  No evidence of neoplasm in the chest, small pericardial effusion stable.  Tiny hepatic cysts, prior right nephrectomy, atherosclerotic and degenerative changes, small umbilical hernia      LAB:   09/06/2023: TSH 0.1245, alb 3.8, ca 9.49, LFTs WNL, cortisol 8.4, mag 2.0, phosphorus 2.8, wbc 10.56, hgb 14.4, plt 204, ANC 7.32  08/09/2023: TSH 0.2418, alb 4.0, ca 9.48, LFTs WNL, cortisol 8.1, wbc 8.89, hgb 14.8, plt 209, ANC 5.54  06/14/2023:  TSH 0.6, albumin 3.9, calcium 9.8, LFTs within normal limits, cortisol 8.9, creatinine 1.54, WBC count 9.01, hemoglobin 15.1, .3, platelet count 187, ANC 6.12.  05/17/2023:  TSH 0.6, free T4 1.2, albumin 3.9, calcium 9.4, LFTs within normal limits, cortisol 12, creatinine 1.48, WBC count 8.01, hemoglobin 14.9, .7, platelet count 188, ANC 4.91.  04/13/23: TSH 5.1882, T4 1.24, cr 1.56, alb 3.9, ca 9.53, LFTs WNL, wbc 9.75, hgb 14.8, plt 197, ANC 6.53  03/16/2023:  Hemoglobin 15.3, hematocrit 47%, WBC 10k platelets 173 K.  BUN 27 creatinine 1.84.  TSH is still very high 89  uI/ml  03.08/23: Cr 1.93, ca 9.52, alb 4.1, LFTs WNL, cortisol 8.6, TSH 79.73 T4 .50, wbc 9.08, hgb 14.9, plt 185, ANC 6.04  02/08/2023:  Creatinine 1.8, albumin 4.2, calcium 9.4, total bili 0.9, AST 18, ALT 15, TSH 25, cortisol 9.6, WBC 9.3, hemoglobin 14.8, .1, platelet count 192.  ANC 6.3.  Magnesium 2.1, phosphorus 3.3.  01/11/23: Cr 1.70, K+ 5.23, mag 2.1, phosphorous 4.7, LFTs WNL, TSH 98.9, Free T4 0.53, wbc 10.84, hgb 14.6, plt 190, ANC 7.48  12/14/2022-potassium 5.3, creatinine 1.74 WBC 10.05, hemoglobin 14.6, platelets 188, TSH 92.608, free T4 less than 0.42  11/16/2022:  Creatinine 1.6, albumin 3.8, calcium 9.5, alkaline phosphatase 100, total bili 0.4, AST 16, ALT 20, TSH 0.020, free T4 pending, WBC count 9.6, hemoglobin 13.7, .5, platelet count 202, ANC 6.36.     10/19/2022:  Creatinine 1.64, albumin 3.8, alkaline phosphatase 110, LFTs within normal limits, TSH 0.039, cortisol 4.9, WBC count 7.6, hemoglobin 13.6, .3, platelet count 191 000, ANC 4.75.    09/02/2022:  Creatinine 1.44, albumin 4.0, alkaline phosphatase 98, total bili 0.7, AST 18, ALT 18, WBC count 9.06, hemoglobin 14.5, platelet count 173.  ANC 5.70.     Past Medical History:   Diagnosis Date    Bladder cancer 2004    tumor removed    BPH (benign prostatic hyperplasia)     Carotid arterial disease     Carotid bruit     COPD (chronic obstructive pulmonary disease)     Enlarged prostate     Gynecomastia     Hyperlipidemia     Hypertension     Kidney stones     Mixed hyperlipidemia     Smoker     Urothelial carcinoma of kidney, right     Vitamin D deficiency        Past Surgical History:   Procedure Laterality Date    KNEE SURGERY      LAPAROSCOPIC ROBOT-ASSISTED SURGICAL REMOVAL OF KIDNEY USING DA JAZMIN XI  07/20/2022    SHOULDER SURGERY         Family History   Problem Relation Age of Onset    Heart disease Mother     Heart disease Father     Emphysema Father     Heart disease Sister     Diabetes Paternal Grandmother         Social History     Socioeconomic History    Marital status:     Number of children: 2   Tobacco Use    Smoking status: Every Day     Current packs/day: 0.50     Types: Cigarettes    Smokeless tobacco: Never   Substance and Sexual Activity    Alcohol use: Never    Drug use: Never    Sexual activity: Not Currently       Current Outpatient Medications   Medication Sig Dispense Refill    albuterol (PROVENTIL) 2.5 mg /3 mL (0.083 %) nebulizer solution Take 2.5 mg by nebulization 4 (four) times daily as needed (up to 4 times daily as neeeded for couch, wheezing or for shortness of breath). Rescue      aspirin (ECOTRIN) 81 MG EC tablet Take 81 mg by mouth once daily.      atorvastatin (LIPITOR) 20 MG tablet Take 1 tablet (20 mg total) by mouth once daily. 90 tablet 2    benazepriL (LOTENSIN) 40 MG tablet Take 1 tablet (40 mg total) by mouth once daily. 90 tablet 1    diclofenac sodium (VOLTAREN) 1 % Gel Apply topically.      finasteride (PROSCAR) 5 mg tablet Take 5 mg by mouth once daily.      levothyroxine (SYNTHROID) 150 MCG tablet Take 150 mcg by mouth once daily.      multivitamin (THERAGRAN) per tablet Take 1 tablet by mouth once daily.      tamsulosin (FLOMAX) 0.4 mg Cap Take 1 capsule (0.4 mg total) by mouth once daily. 90 capsule 1     No current facility-administered medications for this visit.       Review of patient's allergies indicates:   Allergen Reactions    Pravachol [pravastatin]        Review of Systems   Constitutional:  Positive for malaise/fatigue. Negative for chills and fever.   HENT: Negative.     Eyes: Negative.    Respiratory: Negative.  Negative for cough and shortness of breath.    Cardiovascular: Negative.  Negative for chest pain.   Gastrointestinal:  Positive for abdominal pain, diarrhea and nausea. Negative for vomiting.   Genitourinary: Negative.    Musculoskeletal: Negative.    Skin: Negative.  Negative for itching and rash.   Neurological: Negative.  Negative for focal  "weakness.   Endo/Heme/Allergies: Negative.    Psychiatric/Behavioral: Negative.     All other systems reviewed and are negative.       OBJECTIVE   Blood pressure 135/74, pulse 85, temperature 98.2 °F (36.8 °C), temperature source Oral, resp. rate 20, height 5' 8" (1.727 m), weight 86.2 kg (190 lb), SpO2 97 %.     Physical Exam  HENT:      Head: Normocephalic.      Right Ear: External ear normal.      Left Ear: External ear normal.   Eyes:      Pupils: Pupils are equal, round, and reactive to light.   Cardiovascular:      Rate and Rhythm: Normal rate.   Pulmonary:      Effort: Pulmonary effort is normal.      Breath sounds: Normal breath sounds.   Abdominal:      General: Bowel sounds are normal.      Palpations: Abdomen is soft.   Musculoskeletal:      Cervical back: Neck supple.   Skin:     General: Skin is warm.   Neurological:      Mental Status: He is alert and oriented to person, place, and time.   Psychiatric:         Mood and Affect: Mood and affect normal.       Assessment:       # Urothelial carcinoma, right, upper ureter, low-grade, pT3 pNxMX, at least stage III  Negative margins, negative for lymphovascular invasion.    Of note patient had a diagnosis of non-muscle invasive bladder cancer in the past treated with resection followed by intravesical therapy.  Discussed with patient the diagnosis, staging and treatment recommendations including adjuvant therapy in the setting of T3 disease.    Patient is a relatively fit, currently works part-time.  He does have mild hearing loss.  Denies any symptoms of neuropathy.    Discussed the recommendations to undergo adjuvant platinum-based chemotherapy given T3 disease after completion of staging workup.     Patient is hesitant to consider chemotherapy given his experience with his family members going through it.    Noted repeat CT chest abdomen pelvis IV contrast on 09/06/2022 without evidence of metastatic disease   Recommend adjuvant chemotherapy with gem cis.  " Discussed the category 2B recommendation for nivolumab in adjuvant setting.  Patient would like to think about his options prior to making a decision.   He called our office to let us know that he would like to proceed with adjuvant nivolumab.    Initiated adjuvant nivolumab on 09/21/2022..  Patient missed 1 dose of nivolumab in between given issues with thyroid function  Reviewed CT chest abdomen pelvis with contrast from May 2023 without evidence of disease      #Hypothyroidism  - noted very high TSH of 92.6 with free T4 of less than 0.42 while during previous visit was noted to have very low TSH of 0.020 (11/2022) & 0.039 (10/2022). Previously 2.83 (09/2022).  - this could be initially because of the thyroiditis after being started on autoimmune disease and now developed hypothyroidism.  Thus will start the patient on low-dose of levothyroxine for now and referred to endocrinology for further evaluation  - cortisol 6.6 (11/2022)  - Hypothyroidism currently being treated by Dr Aguilar, endocrinologist.   - Now Levothyroxine dose adjusted to 150 mcg X 6 days and 75 mcg on day 7 weekly until next TSH level rechecked 11/2023 per Dr. Aguilar.    Plan:     Proceed with last 12/12 cycle of nivolumab today.  Reviewed labs, continue taking synthroid 150 mcg X 6 days and 75 mcg on day 7 per endocrinology  Continue with Dr. 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 Will forward recent labs.   RTC 4 weeks  w/ CBC, CMP TSH, T4, cortisol  Repeat CT CAP on RTC to assess treatment response after completing 12/12 nivolumab treatments.        Pt instructed to call in the interim for any new or worsening concerns or symptoms

## 2023-09-07 ENCOUNTER — OFFICE VISIT (OUTPATIENT)
Dept: HEMATOLOGY/ONCOLOGY | Facility: CLINIC | Age: 78
End: 2023-09-07
Payer: MEDICARE

## 2023-09-07 VITALS
RESPIRATION RATE: 20 BRPM | HEIGHT: 68 IN | OXYGEN SATURATION: 97 % | TEMPERATURE: 98 F | DIASTOLIC BLOOD PRESSURE: 74 MMHG | WEIGHT: 190 LBS | BODY MASS INDEX: 28.79 KG/M2 | SYSTOLIC BLOOD PRESSURE: 135 MMHG | HEART RATE: 85 BPM

## 2023-09-07 DIAGNOSIS — C68.9 UROTHELIAL CARCINOMA: Primary | ICD-10-CM

## 2023-09-07 PROCEDURE — 99215 OFFICE O/P EST HI 40 MIN: CPT | Mod: ,,,

## 2023-09-07 PROCEDURE — 99215 PR OFFICE/OUTPT VISIT, EST, LEVL V, 40-54 MIN: ICD-10-PCS | Mod: ,,,

## 2023-09-07 RX ORDER — HEPARIN 100 UNIT/ML
500 SYRINGE INTRAVENOUS
Status: CANCELLED | OUTPATIENT
Start: 2023-09-07

## 2023-09-07 RX ORDER — SODIUM CHLORIDE 0.9 % (FLUSH) 0.9 %
10 SYRINGE (ML) INJECTION
Status: CANCELLED | OUTPATIENT
Start: 2023-09-07

## 2023-10-05 ENCOUNTER — OFFICE VISIT (OUTPATIENT)
Dept: HEMATOLOGY/ONCOLOGY | Facility: CLINIC | Age: 78
End: 2023-10-05
Payer: MEDICARE

## 2023-10-05 VITALS
WEIGHT: 192.13 LBS | TEMPERATURE: 99 F | SYSTOLIC BLOOD PRESSURE: 122 MMHG | DIASTOLIC BLOOD PRESSURE: 73 MMHG | HEART RATE: 72 BPM | HEIGHT: 68 IN | RESPIRATION RATE: 18 BRPM | OXYGEN SATURATION: 98 % | BODY MASS INDEX: 29.12 KG/M2

## 2023-10-05 DIAGNOSIS — C64.1 UROTHELIAL CARCINOMA OF KIDNEY, RIGHT: ICD-10-CM

## 2023-10-05 DIAGNOSIS — E03.9 HYPOTHYROIDISM, UNSPECIFIED TYPE: ICD-10-CM

## 2023-10-05 DIAGNOSIS — C68.9 UROTHELIAL CARCINOMA: Primary | ICD-10-CM

## 2023-10-05 PROCEDURE — 99214 OFFICE O/P EST MOD 30 MIN: CPT | Mod: ,,, | Performed by: STUDENT IN AN ORGANIZED HEALTH CARE EDUCATION/TRAINING PROGRAM

## 2023-10-05 PROCEDURE — 99214 PR OFFICE/OUTPT VISIT, EST, LEVL IV, 30-39 MIN: ICD-10-PCS | Mod: ,,, | Performed by: STUDENT IN AN ORGANIZED HEALTH CARE EDUCATION/TRAINING PROGRAM

## 2023-10-05 NOTE — PROGRESS NOTES
Referring provider Dr. Hensley  Reason for consult:  Urothelial carcinoma    Diagnosis:  - Urothelial ca pT3 pNxMX, at least stage III     Treatment History:  Nephro ureterectomy on 23rd July 2022. Refused chemo.   09/21/2022- 09/07/2023: adjuvant Nivolumab    Current Treatment: :  Surveillance      HPI  Patient is a 76-year-old with history of hypertension and BPH who presented to the ER in May 2022 with symptoms of hematuria.  A CT at that point revealed an obstructing mass in the right renal pelvis for which he was seen by Dr. Hensley and underwent a diagnostic ureteroscopy, pathology from which revealed urothelial carcinoma.  Patient underwent a right ureteronephrectomy on 23rd July 2022.  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  Patient presented to clinic to establish care to discuss further disease management.    Patient was started on adjuvant nivolumab on 09/21/2022.    Patient is a current smoker, 25 pack year smoking history, denies alcohol or recreational drug use.  He currently works part-time, [employer].  Denies any family history of malignancy.  Lives with his wife.  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    INTERVAL HISTORY:   Today, 10/05/2023, patient reports symptoms of fatigue as well as intermittent diarrhea which resolves without intervention.  He denies any new lumps or bumps or new foci of pain.  He reports compliance with Synthroid.  He denies any other new medications, ER or hospital visits.    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  Margins free of tumor, lymphovascular invasion not seen.  No regional lymph nodes submitted.  PT3 pNX pGlow-grade    Imaging     09/29/2023 CT chest  abdomen pelvis with contrast:  Development of nonspecific mild left axillary lymphadenopathy.  Measuring up to 1.2 cm stable nonspecific bilateral pulmonary nodules, most of which are calcified, most likely secondary to old granulomatous disease.  No other significant change in the chest.  No significant change in the abdomen.  Status post right nephrectomy with no evidence of residual or recurrent tumor.  No evidence of metastatic disease to the abdomen or pelvis.  Stable indeterminate hepatic and left renal hypodensities most likely cyst or possible hemangiomas within the liver.    May 2022:  CT abdomen pelvis with contrast:  The filling defect in the right renal pelvis is most suggestive of a mass with neoplasm a concern.  This lesion also produces relatively delay in contrast opacification of the right kidney indicating partial urinary obstruction.    09/06/2022 CT chest abdomen pelvis with IV contrast:  No acute pulmonary disease, adenopathy or suspicious pulmonary nodules identified.  Is suspected cyst is visible in the right thyroid lobe, evaluation with dedicated ultrasound to be considered.  Status post right nephrectomy and apparent surgery involving the right lateral wall of the urinary bladder, no gross mucosal lesions visible in the bladder although there is relative wall thickening identified inferiorly and posteriorly.    05/16/2023 CT chest abdomen pelvis with contrast:  No evidence of neoplasm in the chest, small pericardial effusion stable.  Tiny hepatic cysts, prior right nephrectomy, atherosclerotic and degenerative changes, small umbilical hernia      LAB:   10/01/2023:  Creatinine 1.54, albumin 3.9, TSH 0.1778, cortisol 9.5, WBC count 10.5, hemoglobin 14.5, .5, platelet count 190, ANC 7.29.    09/06/2023: TSH 0.1245, alb 3.8, ca 9.49, LFTs WNL, cortisol 8.4, mag 2.0, phosphorus 2.8, wbc 10.56, hgb 14.4, plt 204, ANC 7.32  08/09/2023: TSH 0.2418, alb 4.0, ca 9.48, LFTs WNL, cortisol 8.1,  wbc 8.89, hgb 14.8, plt 209, ANC 5.54  06/14/2023:  TSH 0.6, albumin 3.9, calcium 9.8, LFTs within normal limits, cortisol 8.9, creatinine 1.54, WBC count 9.01, hemoglobin 15.1, .3, platelet count 187, ANC 6.12.  05/17/2023:  TSH 0.6, free T4 1.2, albumin 3.9, calcium 9.4, LFTs within normal limits, cortisol 12, creatinine 1.48, WBC count 8.01, hemoglobin 14.9, .7, platelet count 188, ANC 4.91.  04/13/23: TSH 5.1882, T4 1.24, cr 1.56, alb 3.9, ca 9.53, LFTs WNL, wbc 9.75, hgb 14.8, plt 197, ANC 6.53  03/16/2023:  Hemoglobin 15.3, hematocrit 47%, WBC 10k platelets 173 K.  BUN 27 creatinine 1.84.  TSH is still very high 89 uI/ml  03.08/23: Cr 1.93, ca 9.52, alb 4.1, LFTs WNL, cortisol 8.6, TSH 79.73 T4 .50, wbc 9.08, hgb 14.9, plt 185, ANC 6.04  02/08/2023:  Creatinine 1.8, albumin 4.2, calcium 9.4, total bili 0.9, AST 18, ALT 15, TSH 25, cortisol 9.6, WBC 9.3, hemoglobin 14.8, .1, platelet count 192.  ANC 6.3.  Magnesium 2.1, phosphorus 3.3.  01/11/23: Cr 1.70, K+ 5.23, mag 2.1, phosphorous 4.7, LFTs WNL, TSH 98.9, Free T4 0.53, wbc 10.84, hgb 14.6, plt 190, ANC 7.48  12/14/2022-potassium 5.3, creatinine 1.74 WBC 10.05, hemoglobin 14.6, platelets 188, TSH 92.608, free T4 less than 0.42  11/16/2022:  Creatinine 1.6, albumin 3.8, calcium 9.5, alkaline phosphatase 100, total bili 0.4, AST 16, ALT 20, TSH 0.020, free T4 pending, WBC count 9.6, hemoglobin 13.7, .5, platelet count 202, ANC 6.36.     10/19/2022:  Creatinine 1.64, albumin 3.8, alkaline phosphatase 110, LFTs within normal limits, TSH 0.039, cortisol 4.9, WBC count 7.6, hemoglobin 13.6, .3, platelet count 191 000, ANC 4.75.    09/02/2022:  Creatinine 1.44, albumin 4.0, alkaline phosphatase 98, total bili 0.7, AST 18, ALT 18, WBC count 9.06, hemoglobin 14.5, platelet count 173.  ANC 5.70.     Past Medical History:   Diagnosis Date    Bladder cancer 2004    tumor removed    BPH (benign prostatic hyperplasia)     Carotid  arterial disease     Carotid bruit     COPD (chronic obstructive pulmonary disease)     Enlarged prostate     Gynecomastia     Hyperlipidemia     Hypertension     Kidney stones     Mixed hyperlipidemia     Smoker     Urothelial carcinoma of kidney, right     Vitamin D deficiency        Past Surgical History:   Procedure Laterality Date    KNEE SURGERY      LAPAROSCOPIC ROBOT-ASSISTED SURGICAL REMOVAL OF KIDNEY USING DA JAZMIN XI  07/20/2022    SHOULDER SURGERY         Family History   Problem Relation Age of Onset    Heart disease Mother     Heart disease Father     Emphysema Father     Heart disease Sister     Diabetes Paternal Grandmother        Social History     Socioeconomic History    Marital status:     Number of children: 2   Tobacco Use    Smoking status: Every Day     Current packs/day: 0.50     Types: Cigarettes    Smokeless tobacco: Never   Substance and Sexual Activity    Alcohol use: Never    Drug use: Never    Sexual activity: Not Currently       Current Outpatient Medications   Medication Sig Dispense Refill    albuterol (PROVENTIL) 2.5 mg /3 mL (0.083 %) nebulizer solution Take 2.5 mg by nebulization 4 (four) times daily as needed (up to 4 times daily as neeeded for couch, wheezing or for shortness of breath). Rescue      aspirin (ECOTRIN) 81 MG EC tablet Take 81 mg by mouth once daily.      atorvastatin (LIPITOR) 20 MG tablet Take 1 tablet (20 mg total) by mouth once daily. 90 tablet 2    benazepriL (LOTENSIN) 40 MG tablet Take 1 tablet (40 mg total) by mouth once daily. 90 tablet 1    diclofenac sodium (VOLTAREN) 1 % Gel Apply topically.      finasteride (PROSCAR) 5 mg tablet Take 5 mg by mouth once daily.      levothyroxine (SYNTHROID) 137 MCG Tab tablet Take 137 mcg by mouth once daily.      multivitamin (THERAGRAN) per tablet Take 1 tablet by mouth once daily.      tamsulosin (FLOMAX) 0.4 mg Cap Take 1 capsule (0.4 mg total) by mouth once daily. 90 capsule 1     No current  "facility-administered medications for this visit.       Review of patient's allergies indicates:   Allergen Reactions    Pravachol [pravastatin]        Review of Systems   Constitutional:  Positive for malaise/fatigue. Negative for chills and fever.   HENT: Negative.     Eyes: Negative.    Respiratory: Negative.  Negative for cough and shortness of breath.    Cardiovascular: Negative.  Negative for chest pain.   Gastrointestinal:  Positive for diarrhea. Negative for abdominal pain, nausea and vomiting.   Genitourinary: Negative.    Musculoskeletal: Negative.    Skin: Negative.  Negative for itching and rash.   Neurological: Negative.  Negative for focal weakness.   Endo/Heme/Allergies: Negative.    Psychiatric/Behavioral: Negative.     All other systems reviewed and are negative.       OBJECTIVE   Blood pressure 122/73, pulse 72, temperature 98.5 °F (36.9 °C), temperature source Oral, resp. rate 18, height 5' 8" (1.727 m), weight 87.1 kg (192 lb 1.6 oz), SpO2 98 %.     Physical Exam  HENT:      Head: Normocephalic.      Right Ear: External ear normal.      Left Ear: External ear normal.   Eyes:      Pupils: Pupils are equal, round, and reactive to light.   Cardiovascular:      Rate and Rhythm: Normal rate.   Pulmonary:      Effort: Pulmonary effort is normal.      Breath sounds: Normal breath sounds.   Abdominal:      General: Bowel sounds are normal.      Palpations: Abdomen is soft.   Musculoskeletal:      Cervical back: Neck supple.   Skin:     General: Skin is warm.   Neurological:      Mental Status: He is alert and oriented to person, place, and time.   Psychiatric:         Mood and Affect: Mood and affect normal.         Assessment:       # Urothelial carcinoma, right, upper ureter, low-grade, pT3 pNxMX, at least stage III  Negative margins, negative for lymphovascular invasion.    Of note patient had a diagnosis of non-muscle invasive bladder cancer in the past treated with resection followed by intravesical " therapy.  Discussed with patient the diagnosis, staging and treatment recommendations including adjuvant therapy in the setting of T3 disease.    Patient is a relatively fit, currently works part-time.  He does have mild hearing loss.  Denies any symptoms of neuropathy.    Discussed the recommendations to undergo adjuvant platinum-based chemotherapy given T3 disease after completion of staging workup.     Patient is hesitant to consider chemotherapy given his experience with his family members going through it.    Noted repeat CT chest abdomen pelvis IV contrast on 09/06/2022 without evidence of metastatic disease   Recommend adjuvant chemotherapy with gem cis.  Discussed the category 2B recommendation for nivolumab in adjuvant setting.  Patient would like to think about his options prior to making a decision.   He called our office to let us know that he would like to proceed with adjuvant nivolumab.    Initiated adjuvant nivolumab on 09/21/2022..  Patient missed 1 dose of nivolumab in between given issues with thyroid function  Reviewed CT chest abdomen pelvis with contrast from May 2023 without evidence of disease  CT chest abdomen pelvis with contrast in September 2020 without evidence of disease    #Hypothyroidism  - noted very high TSH of 92.6 with free T4 of less than 0.42 while during previous visit was noted to have very low TSH of 0.020 (11/2022) & 0.039 (10/2022). Previously 2.83 (09/2022).  - this could be initially because of the thyroiditis after being started on autoimmune disease and now developed hypothyroidism.  Thus will start the patient on low-dose of levothyroxine for now and referred to endocrinology for further evaluation  - cortisol 6.6 (11/2022)  - Hypothyroidism currently being treated by Dr Aguilar, endocrinologist.     Plan:   Plan to follow-up in 3 months with repeat labs and CT chest abdomen pelvis with contrast for surveillance.  Plan for scans and labs q.3 months for the 1st 2 years,  q.6 months months thereafter to complete total of 5 years.  Continue follow-up with Dr. Aguilar for hypothyroidism.  Forward labs from today's visit to Dr. Aguilar's clinic.     A total of  30 minutes were spent in review of records, interpretation of test, coordination of care, discussion and counseling with the patient.        Portions of the record may have been created with voice recognition software. Occasional wrong-word or sound-a-like substitutions may have occurred due to the inherent limitations of voice recognition software. Read the chart carefully and recognize, using context, where substitutions have occurred.

## 2023-12-26 NOTE — TELEPHONE ENCOUNTER
-- DO NOT REPLY / DO NOT REPLY ALL --  -- Message is from [facility] ([facility]) --    Request Result  Is the patient currently having Emergent symptoms?: No    Which result are you requesting?: tests/labs preformed 05/16/23    What is the full name of the provider that ordered the lab or test?: Lisa Rawls    Caller Information       Type Contact Phone/Fax    05/17/2023 06:08 PM CDT Phone (Incoming) Amna Millan (Self) [phone] ()          Alternative phone number: none    Clinic site name / Account # for ordering provider: [facility]    Can a detailed message be left?: Yes    Message Turnaround: [place]:    Refer to site's KB page for routing instructions    Please give this turnaround time to the caller:   \"You can expect to receive a response 1-3 business days after your provider's clinical team reviews the message\"    Inform patients: \"Please be aware the return phone call may come from an unidentified or out of state phone number.\"   Patient has lab work appointment scheduled on 01/18/2024.

## 2023-12-27 RX ORDER — DICLOFENAC SODIUM 10 MG/G
GEL TOPICAL 4 TIMES DAILY
Qty: 100 G | Refills: 0 | Status: SHIPPED | OUTPATIENT
Start: 2023-12-27

## 2024-01-04 ENCOUNTER — OFFICE VISIT (OUTPATIENT)
Dept: HEMATOLOGY/ONCOLOGY | Facility: CLINIC | Age: 79
End: 2024-01-04
Payer: MEDICARE

## 2024-01-04 VITALS
RESPIRATION RATE: 18 BRPM | HEIGHT: 68 IN | DIASTOLIC BLOOD PRESSURE: 77 MMHG | SYSTOLIC BLOOD PRESSURE: 137 MMHG | HEART RATE: 83 BPM | OXYGEN SATURATION: 95 % | WEIGHT: 194.31 LBS | BODY MASS INDEX: 29.45 KG/M2 | TEMPERATURE: 98 F

## 2024-01-04 DIAGNOSIS — C68.9 UROTHELIAL CARCINOMA: Primary | ICD-10-CM

## 2024-01-04 PROCEDURE — 99214 OFFICE O/P EST MOD 30 MIN: CPT | Mod: ,,, | Performed by: STUDENT IN AN ORGANIZED HEALTH CARE EDUCATION/TRAINING PROGRAM

## 2024-01-04 NOTE — PROGRESS NOTES
Referring provider Dr. Hensley  Reason for consult:  Urothelial carcinoma    Diagnosis:  - Urothelial ca pT3 pNxMX, at least stage III     Treatment History:  Nephro ureterectomy on 23rd July 2022. Refused chemo.   09/21/2022- 09/07/2023: adjuvant Nivolumab    Current Treatment: :  Surveillance      HPI  Patient is a 76-year-old with history of hypertension and BPH who presented to the ER in May 2022 with symptoms of hematuria.  A CT at that point revealed an obstructing mass in the right renal pelvis for which he was seen by Dr. Hensley and underwent a diagnostic ureteroscopy, pathology from which revealed urothelial carcinoma.  Patient underwent a right ureteronephrectomy on 23rd July 2022.  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  Patient presented to clinic to establish care to discuss further disease management.    Patient was started on adjuvant nivolumab on 09/21/2022.    Patient is a current smoker, 25 pack year smoking history, denies alcohol or recreational drug use.  He currently works part-time, [employer].  Denies any family history of malignancy.  Lives with his wife.  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    INTERVAL HISTORY:   Today, 01/04/2024, patient denies any acute concerns today.  He denies any blood in his urine, new foci of pain, decreased appetite, weight loss, new lumps or bumps, new medications, ER or hospital visits.      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  Margins free of tumor, lymphovascular invasion not seen.  No regional lymph nodes submitted.  PT3 pNX pGlow-grade    Imaging       12/29/2023 CT chest abdomen pelvis with contrast:  No acute pulmonary disease,  adenopathy or suspicious pulmonary nodules identified in the chest.  Status post right nephrectomy.  No findings suspicious for residual or recurrent malignancy identified.  Vascular calcifications producing stenosis in the left common iliac artery and external iliac arteries bilaterally.  Incidental small fat containing left inguinal hernia.    09/29/2023 CT chest abdomen pelvis with contrast:  Development of nonspecific mild left axillary lymphadenopathy.  Measuring up to 1.2 cm stable nonspecific bilateral pulmonary nodules, most of which are calcified, most likely secondary to old granulomatous disease.  No other significant change in the chest.  No significant change in the abdomen.  Status post right nephrectomy with no evidence of residual or recurrent tumor.  No evidence of metastatic disease to the abdomen or pelvis.  Stable indeterminate hepatic and left renal hypodensities most likely cyst or possible hemangiomas within the liver.    May 2022:  CT abdomen pelvis with contrast:  The filling defect in the right renal pelvis is most suggestive of a mass with neoplasm a concern.  This lesion also produces relatively delay in contrast opacification of the right kidney indicating partial urinary obstruction.    09/06/2022 CT chest abdomen pelvis with IV contrast:  No acute pulmonary disease, adenopathy or suspicious pulmonary nodules identified.  Is suspected cyst is visible in the right thyroid lobe, evaluation with dedicated ultrasound to be considered.  Status post right nephrectomy and apparent surgery involving the right lateral wall of the urinary bladder, no gross mucosal lesions visible in the bladder although there is relative wall thickening identified inferiorly and posteriorly.    05/16/2023 CT chest abdomen pelvis with contrast:  No evidence of neoplasm in the chest, small pericardial effusion stable.  Tiny hepatic cysts, prior right nephrectomy, atherosclerotic and degenerative changes, small  umbilical hernia      LAB:     01/03/2024:  Creatinine 1.69, albumin 3.9, calcium 9.37, LFTs unremarkable, TSH 0.47, Free T4 1.27, WBC count 9.6, hemoglobin 14.5, .9, platelet count 199, ANC 6.0.    10/01/2023:  Creatinine 1.54, albumin 3.9, TSH 0.1778, cortisol 9.5, WBC count 10.5, hemoglobin 14.5, .5, platelet count 190, ANC 7.29.    09/06/2023: TSH 0.1245, alb 3.8, ca 9.49, LFTs WNL, cortisol 8.4, mag 2.0, phosphorus 2.8, wbc 10.56, hgb 14.4, plt 204, ANC 7.32  08/09/2023: TSH 0.2418, alb 4.0, ca 9.48, LFTs WNL, cortisol 8.1, wbc 8.89, hgb 14.8, plt 209, ANC 5.54  06/14/2023:  TSH 0.6, albumin 3.9, calcium 9.8, LFTs within normal limits, cortisol 8.9, creatinine 1.54, WBC count 9.01, hemoglobin 15.1, .3, platelet count 187, ANC 6.12.  05/17/2023:  TSH 0.6, free T4 1.2, albumin 3.9, calcium 9.4, LFTs within normal limits, cortisol 12, creatinine 1.48, WBC count 8.01, hemoglobin 14.9, .7, platelet count 188, ANC 4.91.  04/13/23: TSH 5.1882, T4 1.24, cr 1.56, alb 3.9, ca 9.53, LFTs WNL, wbc 9.75, hgb 14.8, plt 197, ANC 6.53  03/16/2023:  Hemoglobin 15.3, hematocrit 47%, WBC 10k platelets 173 K.  BUN 27 creatinine 1.84.  TSH is still very high 89 uI/ml  03.08/23: Cr 1.93, ca 9.52, alb 4.1, LFTs WNL, cortisol 8.6, TSH 79.73 T4 .50, wbc 9.08, hgb 14.9, plt 185, ANC 6.04  02/08/2023:  Creatinine 1.8, albumin 4.2, calcium 9.4, total bili 0.9, AST 18, ALT 15, TSH 25, cortisol 9.6, WBC 9.3, hemoglobin 14.8, .1, platelet count 192.  ANC 6.3.  Magnesium 2.1, phosphorus 3.3.  01/11/23: Cr 1.70, K+ 5.23, mag 2.1, phosphorous 4.7, LFTs WNL, TSH 98.9, Free T4 0.53, wbc 10.84, hgb 14.6, plt 190, ANC 7.48  12/14/2022-potassium 5.3, creatinine 1.74 WBC 10.05, hemoglobin 14.6, platelets 188, TSH 92.608, free T4 less than 0.42  11/16/2022:  Creatinine 1.6, albumin 3.8, calcium 9.5, alkaline phosphatase 100, total bili 0.4, AST 16, ALT 20, TSH 0.020, free T4 pending, WBC count 9.6, hemoglobin 13.7,  .5, platelet count 202, ANC 6.36.     10/19/2022:  Creatinine 1.64, albumin 3.8, alkaline phosphatase 110, LFTs within normal limits, TSH 0.039, cortisol 4.9, WBC count 7.6, hemoglobin 13.6, .3, platelet count 191 000, ANC 4.75.    09/02/2022:  Creatinine 1.44, albumin 4.0, alkaline phosphatase 98, total bili 0.7, AST 18, ALT 18, WBC count 9.06, hemoglobin 14.5, platelet count 173.  ANC 5.70.     Past Medical History:   Diagnosis Date    Bladder cancer 2004    tumor removed    BPH (benign prostatic hyperplasia)     Carotid arterial disease     Carotid bruit     COPD (chronic obstructive pulmonary disease)     Enlarged prostate     Gynecomastia     Hyperlipidemia     Hypertension     Kidney stones     Mixed hyperlipidemia     Smoker     Urothelial carcinoma of kidney, right     Vitamin D deficiency        Past Surgical History:   Procedure Laterality Date    KNEE SURGERY      LAPAROSCOPIC ROBOT-ASSISTED SURGICAL REMOVAL OF KIDNEY USING DA JAZMIN XI  07/20/2022    SHOULDER SURGERY         Family History   Problem Relation Age of Onset    Heart disease Mother     Heart disease Father     Emphysema Father     Heart disease Sister     Diabetes Paternal Grandmother        Social History     Socioeconomic History    Marital status:     Number of children: 2   Tobacco Use    Smoking status: Every Day     Current packs/day: 0.50     Types: Cigarettes    Smokeless tobacco: Never   Substance and Sexual Activity    Alcohol use: Never    Drug use: Never    Sexual activity: Not Currently       Current Outpatient Medications   Medication Sig Dispense Refill    albuterol (PROVENTIL) 2.5 mg /3 mL (0.083 %) nebulizer solution Take 2.5 mg by nebulization 4 (four) times daily as needed (up to 4 times daily as neeeded for couch, wheezing or for shortness of breath). Rescue      aspirin (ECOTRIN) 81 MG EC tablet Take 81 mg by mouth once daily.      atorvastatin (LIPITOR) 20 MG tablet Take 1 tablet (20 mg total) by  mouth once daily. 90 tablet 2    benazepriL (LOTENSIN) 40 MG tablet Take 1 tablet (40 mg total) by mouth once daily. 90 tablet 1    diclofenac sodium (VOLTAREN) 1 % Gel Apply topically 4 (four) times daily. 100 g 0    finasteride (PROSCAR) 5 mg tablet Take 5 mg by mouth once daily.      levothyroxine (SYNTHROID) 137 MCG Tab tablet Take 125 mcg by mouth once daily.      multivitamin (THERAGRAN) per tablet Take 1 tablet by mouth once daily.      tamsulosin (FLOMAX) 0.4 mg Cap Take 1 capsule (0.4 mg total) by mouth once daily. 90 capsule 1     No current facility-administered medications for this visit.       Review of patient's allergies indicates:   Allergen Reactions    Pravachol [pravastatin]        Review of systems     CONSTITUTIONAL: no fevers, no chills, no weight loss, no fatigue, no weakness  HEMATOLOGIC: no abnormal bleeding, no abnormal bruising, no drenching night sweats  ONCOLOGIC: no new masses or lumps  HEENT: no vision loss, no tinnitus or hearing loss, no nose bleeding, no dysphagia, no odynophagia  CVS: no chest pain, no palpitations, no dyspnea on exertion  RESP: no shortness of breath, no hemoptysis, no cough  GI: no nausea, no vomiting, no diarrhea, no constipation, no melena, no hematochezia, no hematemesis, no abdominal pain, no increase in abdominal girth  : no dysuria, no hematuria, no hesitancy, no scrotal swelling, no discharge  INTEGUMENT: no rashes, no abnormal bruising, no nail pitting, no hyperpigmentation  NEURO: no falls, no memory loss, no paresthesias or dysesthesias, no urofecal incontinence or retention, no loss of strength on any extremity  MSK: no back pain, no new joint pain, no joint swelling  PSYCH: no suicidal or homicidal ideation, no depression, no insomnia, no anhedonia  ENDOCRINE: no heat or cold intolerance, no polyuria, no polydipsia       Physical exam     Vitals:    01/04/24 1352   BP: 137/77   Pulse: 83   Resp: 18   Temp: 98.1 °F (36.7 °C)       ECOG PS 0  GA:  AAOx3, NAD  HEENT: NCAT, PERRLA, EOMI, good dentition, moist oral mucous membranes  LYMPH: no cervical, axillary or supraclavicular adenopathy  CVS: s1s2 RRR, no M/R/G  RESP: CTA b/l, no crackles, no wheezes or rhonchi  ABD: soft, NT, ND, BS+, no hepatosplenomegaly  EXT: no deformities, no pedal edema  SKIN: no rashes, warm and dry  NEURO: normal mentation, strength 5/5 on all 4 extremities, no sensory deficits      Assessment:       # Urothelial carcinoma, right, upper ureter, low-grade, pT3 pNxMX, at least stage III  Negative margins, negative for lymphovascular invasion.    Of note patient had a diagnosis of non-muscle invasive bladder cancer in the past treated with resection followed by intravesical therapy.  Discussed with patient the diagnosis, staging and treatment recommendations including adjuvant therapy in the setting of T3 disease.    Patient is a relatively fit, currently works part-time.  He does have mild hearing loss.  Denies any symptoms of neuropathy.    Discussed the recommendations to undergo adjuvant platinum-based chemotherapy given T3 disease after completion of staging workup.     Patient is hesitant to consider chemotherapy given his experience with his family members going through it.    Noted repeat CT chest abdomen pelvis IV contrast on 09/06/2022 without evidence of metastatic disease   Recommend adjuvant chemotherapy with gem cis.  Discussed the category 2B recommendation for nivolumab in adjuvant setting.  Patient would like to think about his options prior to making a decision.   He called our office to let us know that he would like to proceed with adjuvant nivolumab.    Initiated adjuvant nivolumab on 09/21/2022..  Patient missed 1 dose of nivolumab in between given issues with thyroid function  Reviewed CT chest abdomen pelvis with contrast from May 2023 without evidence of disease  CT chest abdomen pelvis with contrast in September 2023 without evidence of disease  CT chest  abdomen pelvis with contrast in December 2023 without evidence of disease      #Hypothyroidism    - noted very high TSH of 92.6 with free T4 of less than 0.42 while during previous visit was noted to have very low TSH of 0.020 (11/2022) & 0.039 (10/2022). Previously 2.83 (09/2022).  - this could be initially because of the thyroiditis after being started on autoimmune disease and now developed hypothyroidism.  Thus will start the patient on low-dose of levothyroxine for now and referred to endocrinology for further evaluation  - cortisol 6.6 (11/2022)  - Hypothyroidism currently being treated by Dr Aguilar, endocrinologist.     Plan:   Plan to follow-up in 3 months with repeat labs and CT chest abdomen pelvis with contrast for surveillance.  Plan for scans and labs q.3 months for the 1st 2 years, q.6 months months thereafter to complete total of 5 years.  Continue follow-up with Dr. Aguilar for hypothyroidism.  Forward labs from today's visit to Dr. Aguilar's clinic.     A total of  30 minutes were spent in review of records, interpretation of test, coordination of care, discussion and counseling with the patient.        Portions of the record may have been created with voice recognition software. Occasional wrong-word or sound-a-like substitutions may have occurred due to the inherent limitations of voice recognition software.

## 2024-01-09 DIAGNOSIS — C67.9 MALIGNANT NEOPLASM OF URINARY BLADDER, UNSPECIFIED SITE: ICD-10-CM

## 2024-01-09 DIAGNOSIS — I10 HYPERTENSION, UNSPECIFIED TYPE: ICD-10-CM

## 2024-01-09 RX ORDER — TAMSULOSIN HYDROCHLORIDE 0.4 MG/1
1 CAPSULE ORAL DAILY
Qty: 90 CAPSULE | Refills: 0 | Status: SHIPPED | OUTPATIENT
Start: 2024-01-09 | End: 2024-01-09 | Stop reason: SDUPTHER

## 2024-01-09 RX ORDER — BENAZEPRIL HYDROCHLORIDE 40 MG/1
40 TABLET ORAL DAILY
Qty: 90 TABLET | Refills: 0 | Status: SHIPPED | OUTPATIENT
Start: 2024-01-09

## 2024-01-09 RX ORDER — TAMSULOSIN HYDROCHLORIDE 0.4 MG/1
1 CAPSULE ORAL DAILY
Qty: 90 CAPSULE | Refills: 0 | Status: SHIPPED | OUTPATIENT
Start: 2024-01-09

## 2024-04-03 DIAGNOSIS — C67.9 MALIGNANT NEOPLASM OF URINARY BLADDER, UNSPECIFIED SITE: ICD-10-CM

## 2024-04-03 DIAGNOSIS — I10 HYPERTENSION, UNSPECIFIED TYPE: ICD-10-CM

## 2024-04-03 RX ORDER — BENAZEPRIL HYDROCHLORIDE 40 MG/1
40 TABLET ORAL DAILY
Qty: 90 TABLET | Refills: 0 | Status: SHIPPED | OUTPATIENT
Start: 2024-04-03

## 2024-04-03 RX ORDER — TAMSULOSIN HYDROCHLORIDE 0.4 MG/1
1 CAPSULE ORAL DAILY
Qty: 90 CAPSULE | Refills: 0 | Status: SHIPPED | OUTPATIENT
Start: 2024-04-03

## 2024-04-09 ENCOUNTER — OFFICE VISIT (OUTPATIENT)
Dept: HEMATOLOGY/ONCOLOGY | Facility: CLINIC | Age: 79
End: 2024-04-09
Payer: MEDICARE

## 2024-04-09 VITALS
WEIGHT: 191.5 LBS | RESPIRATION RATE: 20 BRPM | DIASTOLIC BLOOD PRESSURE: 72 MMHG | TEMPERATURE: 98 F | HEART RATE: 84 BPM | OXYGEN SATURATION: 98 % | BODY MASS INDEX: 29.02 KG/M2 | SYSTOLIC BLOOD PRESSURE: 136 MMHG | HEIGHT: 68 IN

## 2024-04-09 DIAGNOSIS — N18.9 CHRONIC KIDNEY DISEASE, UNSPECIFIED CKD STAGE: ICD-10-CM

## 2024-04-09 DIAGNOSIS — C68.9 UROTHELIAL CARCINOMA: Primary | ICD-10-CM

## 2024-04-09 PROCEDURE — 99215 OFFICE O/P EST HI 40 MIN: CPT | Mod: ,,,

## 2024-04-09 NOTE — PROGRESS NOTES
Referring provider Dr. Hensley  Reason for consult:  Urothelial carcinoma    Diagnosis:  - Urothelial ca pT3 pNxMX, at least stage III     Treatment History:  Nephro ureterectomy on 23rd July 2022. Refused chemo.   09/21/2022- 09/07/2023: adjuvant Nivolumab    Current Treatment: :  Surveillance      HPI  Patient is a 76-year-old with history of hypertension and BPH who presented to the ER in May 2022 with symptoms of hematuria.  A CT at that point revealed an obstructing mass in the right renal pelvis for which he was seen by Dr. Hensley and underwent a diagnostic ureteroscopy, pathology from which revealed urothelial carcinoma.  Patient underwent a right ureteronephrectomy on 23rd July 2022.  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  Patient presented to clinic to establish care to discuss further disease management.    Patient was started on adjuvant nivolumab on 09/21/2022.    Patient is a current smoker, 25 pack year smoking history, denies alcohol or recreational drug use.  He currently works part-time, [employer].  Denies any family history of malignancy.  Lives with his wife.  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    INTERVAL HISTORY:   Today, 04/09/2024, patient denies any acute concerns today.  He denies any blood in his urine, new foci of pain, decreased appetite, weight loss, new lumps or bumps, new medications, ER or hospital visits.      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  Margins free of tumor, lymphovascular invasion not seen.  No regional lymph nodes submitted.  PT3 pNX pGlow-grade    Imaging     04/03/24: CT chest abdomen pelvis with contrast:   No acute pulmonary disease, adenopathy  or suspicious pulmonary nodules identified in the chest. No findings suspicious for residual or recurrent malignancy identified.      12/29/2023 CT chest abdomen pelvis with contrast:  No acute pulmonary disease, adenopathy or suspicious pulmonary nodules identified in the chest.  Status post right nephrectomy.  No findings suspicious for residual or recurrent malignancy identified.  Vascular calcifications producing stenosis in the left common iliac artery and external iliac arteries bilaterally.  Incidental small fat containing left inguinal hernia.    09/29/2023 CT chest abdomen pelvis with contrast:  Development of nonspecific mild left axillary lymphadenopathy.  Measuring up to 1.2 cm stable nonspecific bilateral pulmonary nodules, most of which are calcified, most likely secondary to old granulomatous disease.  No other significant change in the chest.  No significant change in the abdomen.  Status post right nephrectomy with no evidence of residual or recurrent tumor.  No evidence of metastatic disease to the abdomen or pelvis.  Stable indeterminate hepatic and left renal hypodensities most likely cyst or possible hemangiomas within the liver.    May 2022:  CT abdomen pelvis with contrast:  The filling defect in the right renal pelvis is most suggestive of a mass with neoplasm a concern.  This lesion also produces relatively delay in contrast opacification of the right kidney indicating partial urinary obstruction.    09/06/2022 CT chest abdomen pelvis with IV contrast:  No acute pulmonary disease, adenopathy or suspicious pulmonary nodules identified.  Is suspected cyst is visible in the right thyroid lobe, evaluation with dedicated ultrasound to be considered.  Status post right nephrectomy and apparent surgery involving the right lateral wall of the urinary bladder, no gross mucosal lesions visible in the bladder although there is relative wall thickening identified inferiorly and  posteriorly.    05/16/2023 CT chest abdomen pelvis with contrast:  No evidence of neoplasm in the chest, small pericardial effusion stable.  Tiny hepatic cysts, prior right nephrectomy, atherosclerotic and degenerative changes, small umbilical hernia      LAB:     04/02/24: cr 1.49, alb 3.8, ca 9.52, LFTs WNL, TSH 0.201, wbc 9.07, hgb 15.3, plt 191, ANC 5.41  01/03/2024:  Creatinine 1.69, albumin 3.9, calcium 9.37, LFTs unremarkable, TSH 0.47, Free T4 1.27, WBC count 9.6, hemoglobin 14.5, .9, platelet count 199, ANC 6.0.    10/01/2023:  Creatinine 1.54, albumin 3.9, TSH 0.1778, cortisol 9.5, WBC count 10.5, hemoglobin 14.5, .5, platelet count 190, ANC 7.29.    09/06/2023: TSH 0.1245, alb 3.8, ca 9.49, LFTs WNL, cortisol 8.4, mag 2.0, phosphorus 2.8, wbc 10.56, hgb 14.4, plt 204, ANC 7.32  08/09/2023: TSH 0.2418, alb 4.0, ca 9.48, LFTs WNL, cortisol 8.1, wbc 8.89, hgb 14.8, plt 209, ANC 5.54  06/14/2023:  TSH 0.6, albumin 3.9, calcium 9.8, LFTs within normal limits, cortisol 8.9, creatinine 1.54, WBC count 9.01, hemoglobin 15.1, .3, platelet count 187, ANC 6.12.  05/17/2023:  TSH 0.6, free T4 1.2, albumin 3.9, calcium 9.4, LFTs within normal limits, cortisol 12, creatinine 1.48, WBC count 8.01, hemoglobin 14.9, .7, platelet count 188, ANC 4.91.  04/13/23: TSH 5.1882, T4 1.24, cr 1.56, alb 3.9, ca 9.53, LFTs WNL, wbc 9.75, hgb 14.8, plt 197, ANC 6.53  03/16/2023:  Hemoglobin 15.3, hematocrit 47%, WBC 10k platelets 173 K.  BUN 27 creatinine 1.84.  TSH is still very high 89 uI/ml  03.08/23: Cr 1.93, ca 9.52, alb 4.1, LFTs WNL, cortisol 8.6, TSH 79.73 T4 .50, wbc 9.08, hgb 14.9, plt 185, ANC 6.04  02/08/2023:  Creatinine 1.8, albumin 4.2, calcium 9.4, total bili 0.9, AST 18, ALT 15, TSH 25, cortisol 9.6, WBC 9.3, hemoglobin 14.8, .1, platelet count 192.  ANC 6.3.  Magnesium 2.1, phosphorus 3.3.  01/11/23: Cr 1.70, K+ 5.23, mag 2.1, phosphorous 4.7, LFTs WNL, TSH 98.9, Free T4 0.53, wbc  10.84, hgb 14.6, plt 190, ANC 7.48  12/14/2022-potassium 5.3, creatinine 1.74 WBC 10.05, hemoglobin 14.6, platelets 188, TSH 92.608, free T4 less than 0.42  11/16/2022:  Creatinine 1.6, albumin 3.8, calcium 9.5, alkaline phosphatase 100, total bili 0.4, AST 16, ALT 20, TSH 0.020, free T4 pending, WBC count 9.6, hemoglobin 13.7, .5, platelet count 202, ANC 6.36.     10/19/2022:  Creatinine 1.64, albumin 3.8, alkaline phosphatase 110, LFTs within normal limits, TSH 0.039, cortisol 4.9, WBC count 7.6, hemoglobin 13.6, .3, platelet count 191 000, ANC 4.75.    09/02/2022:  Creatinine 1.44, albumin 4.0, alkaline phosphatase 98, total bili 0.7, AST 18, ALT 18, WBC count 9.06, hemoglobin 14.5, platelet count 173.  ANC 5.70.     Past Medical History:   Diagnosis Date    Bladder cancer 2004    tumor removed    BPH (benign prostatic hyperplasia)     Carotid arterial disease     Carotid bruit     COPD (chronic obstructive pulmonary disease)     Enlarged prostate     Gynecomastia     Hyperlipidemia     Hypertension     Kidney stones     Mixed hyperlipidemia     Smoker     Urothelial carcinoma of kidney, right     Vitamin D deficiency        Past Surgical History:   Procedure Laterality Date    KNEE SURGERY      LAPAROSCOPIC ROBOT-ASSISTED SURGICAL REMOVAL OF KIDNEY USING DA JAZMIN XI  07/20/2022    SHOULDER SURGERY         Family History   Problem Relation Age of Onset    Heart disease Mother     Heart disease Father     Emphysema Father     Heart disease Sister     Diabetes Paternal Grandmother        Social History     Socioeconomic History    Marital status:     Number of children: 2   Tobacco Use    Smoking status: Every Day     Current packs/day: 0.50     Types: Cigarettes    Smokeless tobacco: Never   Substance and Sexual Activity    Alcohol use: Never    Drug use: Never    Sexual activity: Not Currently       Current Outpatient Medications   Medication Sig Dispense Refill    albuterol (PROVENTIL) 2.5  mg /3 mL (0.083 %) nebulizer solution Take 2.5 mg by nebulization 4 (four) times daily as needed (up to 4 times daily as neeeded for couch, wheezing or for shortness of breath). Rescue      aspirin (ECOTRIN) 81 MG EC tablet Take 81 mg by mouth once daily.      atorvastatin (LIPITOR) 20 MG tablet Take 1 tablet (20 mg total) by mouth once daily. 90 tablet 2    benazepriL (LOTENSIN) 40 MG tablet Take 1 tablet (40 mg total) by mouth once daily. 90 tablet 0    diclofenac sodium (VOLTAREN) 1 % Gel Apply topically 4 (four) times daily. 100 g 0    finasteride (PROSCAR) 5 mg tablet Take 5 mg by mouth once daily.      levothyroxine (SYNTHROID) 137 MCG Tab tablet Take 125 mcg by mouth once daily.      multivitamin (THERAGRAN) per tablet Take 1 tablet by mouth once daily.      tamsulosin (FLOMAX) 0.4 mg Cap Take 1 capsule (0.4 mg total) by mouth once daily. 90 capsule 0     No current facility-administered medications for this visit.       Review of patient's allergies indicates:   Allergen Reactions    Pravachol [pravastatin]        Review of systems     CONSTITUTIONAL: no fevers, no chills, no weight loss, no fatigue, no weakness  HEMATOLOGIC: no abnormal bleeding, no abnormal bruising, no drenching night sweats  ONCOLOGIC: no new masses or lumps  HEENT: no vision loss, no tinnitus or hearing loss, no nose bleeding, no dysphagia, no odynophagia  CVS: no chest pain, no palpitations, no dyspnea on exertion  RESP: no shortness of breath, no hemoptysis, no cough  GI: no nausea, no vomiting, no diarrhea, no constipation, no melena, no hematochezia, no hematemesis, no abdominal pain, no increase in abdominal girth  : no dysuria, no hematuria, no hesitancy, no scrotal swelling, no discharge  INTEGUMENT: no rashes, no abnormal bruising, no nail pitting, no hyperpigmentation  NEURO: no falls, no memory loss, no paresthesias or dysesthesias, no urofecal incontinence or retention, no loss of strength on any extremity  MSK: no back  pain, no new joint pain, no joint swelling  PSYCH: no suicidal or homicidal ideation, no depression, no insomnia, no anhedonia  ENDOCRINE: no heat or cold intolerance, no polyuria, no polydipsia       Physical exam     Vitals:    04/09/24 1350   BP: 136/72   Pulse: 84   Resp: 20   Temp: 98.1 °F (36.7 °C)       ECOG PS 0  GA: AAOx3, NAD  HEENT: NCAT, PERRLA, EOMI, good dentition, moist oral mucous membranes  LYMPH: no cervical, axillary or supraclavicular adenopathy  CVS: s1s2 RRR, no M/R/G  RESP: CTA b/l, no crackles, no wheezes or rhonchi  ABD: soft, NT, ND, BS+, no hepatosplenomegaly  EXT: no deformities, no pedal edema  SKIN: no rashes, warm and dry  NEURO: normal mentation, strength 5/5 on all 4 extremities, no sensory deficits      Assessment:       # Urothelial carcinoma, right, upper ureter, low-grade, pT3 pNxMX, at least stage III  Negative margins, negative for lymphovascular invasion.    Of note patient had a diagnosis of non-muscle invasive bladder cancer in the past treated with resection followed by intravesical therapy.  Discussed with patient the diagnosis, staging and treatment recommendations including adjuvant therapy in the setting of T3 disease.    Patient is a relatively fit, currently works part-time.  He does have mild hearing loss.  Denies any symptoms of neuropathy.    Discussed the recommendations to undergo adjuvant platinum-based chemotherapy given T3 disease after completion of staging workup.     Patient is hesitant to consider chemotherapy given his experience with his family members going through it.    Noted repeat CT chest abdomen pelvis IV contrast on 09/06/2022 without evidence of metastatic disease   Recommend adjuvant chemotherapy with gem cis.  Discussed the category 2B recommendation for nivolumab in adjuvant setting.  Patient would like to think about his options prior to making a decision.   He called our office to let us know that he would like to proceed with adjuvant  nivolumab.    Initiated adjuvant nivolumab on 09/21/2022..  Patient missed 1 dose of nivolumab in between given issues with thyroid function  Reviewed CT chest abdomen pelvis with contrast from May 2023 without evidence of disease  CT chest abdomen pelvis with contrast in September 2023 without evidence of disease  CT chest abdomen pelvis with contrast in December 2023 without evidence of disease  CT chest abdomen pelvis with contrast in April 2024 without evidence of disease      #Hypothyroidism    - noted very high TSH of 92.6 with free T4 of less than 0.42 while during previous visit was noted to have very low TSH of 0.020 (11/2022) & 0.039 (10/2022). Previously 2.83 (09/2022).  - this could be initially because of the thyroiditis after being started on autoimmune disease and now developed hypothyroidism.  Thus will start the patient on low-dose of levothyroxine for now and referred to endocrinology for further evaluation  - cortisol 6.6 (11/2022)  - Hypothyroidism currently being treated by Dr Aguilar, endocrinologist.     Plan:   Plan to follow-up in 3 months with repeat labs and CT chest abdomen pelvis with contrast for surveillance.  Plan for scans and labs q.3 months for the 1st 2 years, q.6 months months thereafter to complete total of 5 years.  Continue follow-up with Dr. Aguilar for hypothyroidism.   Forward labs from today's visit to Dr. Aguilar's clinic. TSH 0.201    A total of  40 minutes were spent in review of records, interpretation of test, coordination of care, discussion and counseling with the patient.

## 2024-05-20 DIAGNOSIS — E78.5 HYPERLIPIDEMIA, UNSPECIFIED HYPERLIPIDEMIA TYPE: ICD-10-CM

## 2024-05-20 PROCEDURE — 80061 LIPID PANEL: CPT | Performed by: FAMILY MEDICINE

## 2024-05-20 PROCEDURE — 84443 ASSAY THYROID STIM HORMONE: CPT | Performed by: FAMILY MEDICINE

## 2024-05-20 PROCEDURE — 85025 COMPLETE CBC W/AUTO DIFF WBC: CPT | Performed by: FAMILY MEDICINE

## 2024-05-20 PROCEDURE — 80053 COMPREHEN METABOLIC PANEL: CPT | Performed by: FAMILY MEDICINE

## 2024-05-20 PROCEDURE — 82550 ASSAY OF CK (CPK): CPT | Performed by: FAMILY MEDICINE

## 2024-05-21 RX ORDER — ATORVASTATIN CALCIUM 20 MG/1
20 TABLET, FILM COATED ORAL DAILY
Qty: 90 TABLET | Refills: 1 | Status: SHIPPED | OUTPATIENT
Start: 2024-05-21

## 2024-05-24 ENCOUNTER — OFFICE VISIT (OUTPATIENT)
Dept: FAMILY MEDICINE | Facility: CLINIC | Age: 79
End: 2024-05-24
Payer: MEDICARE

## 2024-05-24 ENCOUNTER — TELEPHONE (OUTPATIENT)
Dept: FAMILY MEDICINE | Facility: CLINIC | Age: 79
End: 2024-05-24

## 2024-05-24 VITALS
OXYGEN SATURATION: 96 % | SYSTOLIC BLOOD PRESSURE: 126 MMHG | TEMPERATURE: 97 F | DIASTOLIC BLOOD PRESSURE: 80 MMHG | BODY MASS INDEX: 28.98 KG/M2 | WEIGHT: 190.63 LBS | HEART RATE: 84 BPM | RESPIRATION RATE: 18 BRPM

## 2024-05-24 DIAGNOSIS — Z79.899 ENCOUNTER FOR LONG-TERM (CURRENT) USE OF OTHER MEDICATIONS: ICD-10-CM

## 2024-05-24 DIAGNOSIS — I10 PRIMARY HYPERTENSION: Primary | ICD-10-CM

## 2024-05-24 DIAGNOSIS — N18.32 STAGE 3B CHRONIC KIDNEY DISEASE: ICD-10-CM

## 2024-05-24 DIAGNOSIS — I77.9 CAROTID ARTERY DISEASE, UNSPECIFIED LATERALITY, UNSPECIFIED TYPE: ICD-10-CM

## 2024-05-24 DIAGNOSIS — Z12.5 SCREENING FOR PROSTATE CANCER: ICD-10-CM

## 2024-05-24 PROCEDURE — 99213 OFFICE O/P EST LOW 20 MIN: CPT | Mod: ,,, | Performed by: FAMILY MEDICINE

## 2024-05-24 RX ORDER — LEVOTHYROXINE SODIUM 112 UG/1
112 TABLET ORAL
COMMUNITY

## 2024-05-24 NOTE — PROGRESS NOTES
Family Medicine    Patient ID: [id number]   274}  Chief Complaint: Results         HPI:     Moise Rosado is a 78 y.o. male here today for a follow up. No other complaints today.     Last CT for urothelial cancer surveillance was clear. F/u in 3 months.   Sees Dr. Aguilar for hypothyroidism. Recent change in medication dose.   Recent CTA of neck and found blockage at 80% needs surgery,Dr. Cleveland. Has appt with surgeon- Dr. Neville  Sees nephrologist. Saw him recently. Dr. Pickard in [place]. Has renal diet and is aware of elevated potassium. Knows to follow a low potassium diet.     Potassium and Creatinine up some. Need recent labs from Dr. Pickard. Will request.     274}  Past Medical History:   Diagnosis Date    Bladder cancer 2004    tumor removed    BPH (benign prostatic hyperplasia)     Carotid arterial disease     Carotid bruit     COPD (chronic obstructive pulmonary disease)     Enlarged prostate     Gynecomastia     Hyperlipidemia     Hypertension     Kidney stones     Mixed hyperlipidemia     Smoker     Urothelial carcinoma of kidney, right     Vitamin D deficiency          Past Surgical History:   Procedure Laterality Date    KNEE SURGERY      LAPAROSCOPIC ROBOT-ASSISTED SURGICAL REMOVAL OF KIDNEY USING DA JAZMIN XI  07/20/2022    SHOULDER SURGERY          Social History     Tobacco Use    Smoking status: Every Day     Current packs/day: 1.00     Types: Cigarettes    Smokeless tobacco: Never   Substance and Sexual Activity    Alcohol use: Never    Drug use: Never    Sexual activity: Not Currently        Current Outpatient Medications   Medication Instructions    albuterol (PROVENTIL) 2.5 mg, Nebulization, 4 times daily PRN, Rescue    aspirin (ECOTRIN) 81 mg, Oral, Daily    atorvastatin (LIPITOR) 20 mg, Oral, Daily    benazepriL (LOTENSIN) 40 mg, Oral, Daily    diclofenac sodium (VOLTAREN) 1 % Gel Topical (Top), 4 times daily    finasteride (PROSCAR) 5 mg, Oral, Daily    levothyroxine (SYNTHROID) 125  mcg, Oral, Daily    levothyroxine sodium (LEVOTHYROXINE ORAL) 117 mcg, Oral, Daily PRN, Daily 30 minutes before breakfast<BR>    multivitamin (THERAGRAN) per tablet 1 tablet, Oral, Daily    tamsulosin (FLOMAX) 0.4 mg, Oral, Daily     274}  Review of patient's allergies indicates:   Allergen Reactions    Pravachol [pravastatin] Other (See Comments)     Muscle aches        Patient Care Team:  Dallas Iverson Sr., MD as PCP - General (Family Medicine)  Jed Hensley MD as Consulting Physician (Urology)  Rohan Aguilar MD as Consulting Physician (Endocrinology)  Audelia Montana MD as Consulting Physician (Oncology)  Samuel Wesley MD as Consulting Physician (General Surgery)     Subjective:     Review of Systems    12 point review of systems conducted, negative except as stated in the history of present illness. See HPI for details.    Objective:   274}  Visit Vitals  /80 (BP Location: Left arm, Patient Position: Sitting, BP Method: Medium (Manual))   Pulse 84   Temp 97.2 °F (36.2 °C) (Temporal)   Resp 18   Wt 86.5 kg (190 lb 9.6 oz)   SpO2 96%   BMI 28.98 kg/m²         Physical Exam  Vitals reviewed.   Constitutional:       General: He is not in acute distress.     Appearance: Normal appearance.   Eyes:      Extraocular Movements: Extraocular movements intact.      Conjunctiva/sclera: Conjunctivae normal.   Cardiovascular:      Rate and Rhythm: Normal rate and regular rhythm.      Pulses: Normal pulses.      Heart sounds: Normal heart sounds. No murmur heard.     No friction rub. No gallop.   Pulmonary:      Effort: Pulmonary effort is normal.      Breath sounds: Normal breath sounds. No wheezing, rhonchi or rales.   Skin:     General: Skin is warm and dry.      Findings: No lesion or rash.   Neurological:      General: No focal deficit present.      Mental Status: He is alert and oriented to person, place, and time.   Psychiatric:         Mood and Affect: Mood normal.         Behavior: Behavior  normal.         Thought Content: Thought content normal.         Judgment: Judgment normal.         Labs Reviewed:    274}  Chemistry:  Lab Results   Component Value Date     05/20/2024    K 5.2 (H) 05/20/2024    CHLORIDE 109 (H) 05/20/2024    BUN 19.2 05/20/2024    CREATININE 1.57 (H) 05/20/2024    EGFRNORACEVR 45 05/20/2024    GLUCOSE 90 05/20/2024    CALCIUM 9.5 05/20/2024    ALKPHOS 102 05/20/2024    LABPROT 7.5 05/20/2024    ALBUMIN 4.0 05/20/2024    AST 19 05/20/2024    ALT 18 05/20/2024    TSH 0.570 05/20/2024        Lab Results   Component Value Date    HGBA1C 5.4 07/09/2020        Hematology:  Lab Results   Component Value Date    WBC 10.05 05/20/2024    HGB 15.4 05/20/2024    HCT 47.8 05/20/2024     05/20/2024       Lipid Panel:  Lab Results   Component Value Date    CHOL 152 05/20/2024    HDL 39 05/20/2024    LDL 74.00 05/20/2024    TRIG 194 (H) 05/20/2024    TOTALCHOLEST 4 05/20/2024          Assessment:    274}    ICD-10-CM ICD-9-CM   1. Primary hypertension  I10 401.9   2. Carotid artery disease, unspecified laterality, unspecified type  I77.9 447.9   3. Stage 3b chronic kidney disease  N18.32 585.3   4. Encounter for long-term (current) use of other medications  Z79.899 V58.69   5. Screening for prostate cancer  Z12.5 V76.44        Plan:     1. Primary hypertension  Overview:  Continue with Aspirin 81 mg daily + Benazepril 40 mg daily.  Patient is well controlled.  Low Sodium Diet (DASH Diet - Less than 2 grams of sodium per day).  Monitor blood pressure daily and log. Report consistent numbers greater than 140/90.  Maintain healthy weight with goal BMI <30. Exercise 30 minutes per day, 5 days per week.    Assessment & Plan:  Controlled. Continue meds. F/u in 6 months or sooner if problems arise.      2. Carotid artery disease, unspecified laterality, unspecified type  Assessment & Plan:  See Dr. Silvio Neville as planned.       3. Stage 3b chronic kidney disease  Overview:  Stable  from renal standpoint.  Follow renoprotective measures including Renal Diet (reduce intake of nuts, peanut butter, milk, cheese, dried beans, peas) and Low Sodium Diet (less than 2 grams per day).  Avoid NSAIDs (Aleve, Mobic, Celebrex, Ibuprofen, Advil, Toradol and Diclofenac). May take Tylenol as needed for headache/pain.  Control DM with goal A1C <7. BP goal <130/80. LDL goal < 100.  Stay well hydrated. Avoid alcohol and soda. Limit tea and coffee.  His BUN  &  Creat are 25.68 & 1.60.  Electrolytes sodium, potassium and calcium are within normal limits.    Assessment & Plan:  Get records from Nephrologist. Follow a renal diet. Watch potassium rich food intake.       4. Encounter for long-term (current) use of other medications  Overview:  Patient was instructed to continue with the prescribed medications as no adverse or cost issues were found.   Refills were given if needed.  Compliance issues were discussed as far as medications that patient is currently taking.  Discussed the possibility of getting off some medications that were not absolutely necessary.    Assessment & Plan:  Labs look good. Repeat at next OV      5. Screening for prostate cancer  -     PSA, Screening; Future; Expected date: 06/07/2024         Follow up in about 6 months (around 11/24/2024) for an Annual Wellness Visit, with labs. In addition to their scheduled follow up, the patient has also been instructed to follow up on as needed basis.     Future Appointments   Date Time Provider Department Center   7/9/2024 10:40 AM Audelia Montana MD [facility]   11/25/2024  7:20 AM LAB, [facility] OSWALD Hartman MD

## 2024-05-24 NOTE — TELEPHONE ENCOUNTER
----- Message from Brianda Venegas sent at 5/24/2024 11:17 AM CDT -----  Patient confirmed Levothyroxine 1.12 mcg is what he is taking.

## 2024-05-29 ENCOUNTER — TELEPHONE (OUTPATIENT)
Dept: FAMILY MEDICINE | Facility: CLINIC | Age: 79
End: 2024-05-29
Payer: MEDICARE

## 2024-05-29 NOTE — TELEPHONE ENCOUNTER
----- Message from Maira Hartman MD sent at 5/24/2024 10:48 AM CDT -----  Regarding: Record reqeu  Pt now established with Dr. Pickard, Nephrologist, in [place].   Please request records.

## 2024-07-03 ENCOUNTER — LAB VISIT (OUTPATIENT)
Dept: LAB | Facility: HOSPITAL | Age: 79
End: 2024-07-03
Attending: PHYSICAL MEDICINE & REHABILITATION
Payer: OTHER GOVERNMENT

## 2024-07-03 DIAGNOSIS — E21.5 PARATHYROID DISEASE: Primary | ICD-10-CM

## 2024-07-03 LAB
T3FREE SERPL-MCNC: 2.96 PG/ML (ref 1.58–3.91)
T4 FREE SERPL-MCNC: 1.35 NG/DL (ref 0.7–1.48)
TSH SERPL-ACNC: 0.28 UIU/ML (ref 0.35–4.94)

## 2024-07-03 PROCEDURE — 84481 FREE ASSAY (FT-3): CPT

## 2024-07-03 PROCEDURE — 84443 ASSAY THYROID STIM HORMONE: CPT

## 2024-07-03 PROCEDURE — 36415 COLL VENOUS BLD VENIPUNCTURE: CPT

## 2024-07-03 PROCEDURE — 84439 ASSAY OF FREE THYROXINE: CPT

## 2024-07-09 ENCOUNTER — OFFICE VISIT (OUTPATIENT)
Dept: HEMATOLOGY/ONCOLOGY | Facility: CLINIC | Age: 79
End: 2024-07-09
Payer: MEDICARE

## 2024-07-09 VITALS
OXYGEN SATURATION: 98 % | TEMPERATURE: 98 F | BODY MASS INDEX: 28.78 KG/M2 | HEIGHT: 68 IN | RESPIRATION RATE: 14 BRPM | HEART RATE: 73 BPM | WEIGHT: 189.88 LBS | DIASTOLIC BLOOD PRESSURE: 81 MMHG | SYSTOLIC BLOOD PRESSURE: 136 MMHG

## 2024-07-09 DIAGNOSIS — E03.9 HYPOTHYROIDISM, UNSPECIFIED TYPE: ICD-10-CM

## 2024-07-09 DIAGNOSIS — I10 HYPERTENSION, UNSPECIFIED TYPE: ICD-10-CM

## 2024-07-09 DIAGNOSIS — C68.9 UROTHELIAL CARCINOMA: Primary | ICD-10-CM

## 2024-07-09 PROCEDURE — 99214 OFFICE O/P EST MOD 30 MIN: CPT | Mod: ,,, | Performed by: STUDENT IN AN ORGANIZED HEALTH CARE EDUCATION/TRAINING PROGRAM

## 2024-07-09 RX ORDER — BENAZEPRIL HYDROCHLORIDE 40 MG/1
40 TABLET ORAL DAILY
Qty: 90 TABLET | Refills: 1 | Status: SHIPPED | OUTPATIENT
Start: 2024-07-09

## 2024-07-09 NOTE — PROGRESS NOTES
Referring provider Dr. Hensley  Reason for consult:  Urothelial carcinoma    Diagnosis:  - Urothelial ca pT3 pNxMX, at least stage III     Treatment History:  Nephro ureterectomy on 23rd July 2022. Refused chemo.   09/21/2022- 09/07/2023: adjuvant Nivolumab    Current Treatment: :  Surveillance      HPI  Patient is a 76-year-old with history of hypertension and BPH who presented to the ER in May 2022 with symptoms of hematuria.  A CT at that point revealed an obstructing mass in the right renal pelvis for which he was seen by Dr. Hensley and underwent a diagnostic ureteroscopy, pathology from which revealed urothelial carcinoma.  Patient underwent a right ureteronephrectomy on 23rd July 2022.  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  Patient presented to clinic to establish care to discuss further disease management.    Patient was started on adjuvant nivolumab on 09/21/2022.    Patient is a current smoker, 25 pack year smoking history, denies alcohol or recreational drug use.  He currently works part-time, [employer].  Denies any family history of malignancy.  Lives with his wife.  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    INTERVAL HISTORY:     Today, 07/09/24, patient denies any acute concerns today.  He denies any symptoms of hematuria.  He denies any new lumps or bumps, decreased appetite or weight loss.  He reports undergoing carotid stent placement since his last follow-up visit with us.  He denies any new medications, ER or hospital visits.  He reports close follow-up with endocrinology and Urology.    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  Margins free of tumor, lymphovascular invasion not seen.   No regional lymph nodes submitted.  PT3 pNX pGlow-grade    Imaging     07/05/2024 CT chest abdomen pelvis with contrast no significant change in the chest since prior study.  No significant change since prior study in the abdomen or pelvis.  No evidence of local recurrence of tumor or metastatic disease.    04/03/24: CT chest abdomen pelvis with contrast:   No acute pulmonary disease, adenopathy or suspicious pulmonary nodules identified in the chest. No findings suspicious for residual or recurrent malignancy identified.      12/29/2023 CT chest abdomen pelvis with contrast:  No acute pulmonary disease, adenopathy or suspicious pulmonary nodules identified in the chest.  Status post right nephrectomy.  No findings suspicious for residual or recurrent malignancy identified.  Vascular calcifications producing stenosis in the left common iliac artery and external iliac arteries bilaterally.  Incidental small fat containing left inguinal hernia.    09/29/2023 CT chest abdomen pelvis with contrast:  Development of nonspecific mild left axillary lymphadenopathy.  Measuring up to 1.2 cm stable nonspecific bilateral pulmonary nodules, most of which are calcified, most likely secondary to old granulomatous disease.  No other significant change in the chest.  No significant change in the abdomen.  Status post right nephrectomy with no evidence of residual or recurrent tumor.  No evidence of metastatic disease to the abdomen or pelvis.  Stable indeterminate hepatic and left renal hypodensities most likely cyst or possible hemangiomas within the liver.    May 2022:  CT abdomen pelvis with contrast:  The filling defect in the right renal pelvis is most suggestive of a mass with neoplasm a concern.  This lesion also produces relatively delay in contrast opacification of the right kidney indicating partial urinary obstruction.    09/06/2022 CT chest abdomen pelvis with IV contrast:  No acute pulmonary disease, adenopathy or  suspicious pulmonary nodules identified.  Is suspected cyst is visible in the right thyroid lobe, evaluation with dedicated ultrasound to be considered.  Status post right nephrectomy and apparent surgery involving the right lateral wall of the urinary bladder, no gross mucosal lesions visible in the bladder although there is relative wall thickening identified inferiorly and posteriorly.    05/16/2023 CT chest abdomen pelvis with contrast:  No evidence of neoplasm in the chest, small pericardial effusion stable.  Tiny hepatic cysts, prior right nephrectomy, atherosclerotic and degenerative changes, small umbilical hernia      LAB:     07/01/2024 creatinine 1.58, albumin 3.8, LFTs unremarkable, TSH 0.2, WBC count 9.9, hemoglobin 13.8, .9, platelet count 231, ANC 6.48        04/02/24: cr 1.49, alb 3.8, ca 9.52, LFTs WNL, TSH 0.201, wbc 9.07, hgb 15.3, plt 191, ANC 5.41  01/03/2024:  Creatinine 1.69, albumin 3.9, calcium 9.37, LFTs unremarkable, TSH 0.47, Free T4 1.27, WBC count 9.6, hemoglobin 14.5, .9, platelet count 199, ANC 6.0.    10/01/2023:  Creatinine 1.54, albumin 3.9, TSH 0.1778, cortisol 9.5, WBC count 10.5, hemoglobin 14.5, .5, platelet count 190, ANC 7.29.    09/06/2023: TSH 0.1245, alb 3.8, ca 9.49, LFTs WNL, cortisol 8.4, mag 2.0, phosphorus 2.8, wbc 10.56, hgb 14.4, plt 204, ANC 7.32  08/09/2023: TSH 0.2418, alb 4.0, ca 9.48, LFTs WNL, cortisol 8.1, wbc 8.89, hgb 14.8, plt 209, ANC 5.54  06/14/2023:  TSH 0.6, albumin 3.9, calcium 9.8, LFTs within normal limits, cortisol 8.9, creatinine 1.54, WBC count 9.01, hemoglobin 15.1, .3, platelet count 187, ANC 6.12.  05/17/2023:  TSH 0.6, free T4 1.2, albumin 3.9, calcium 9.4, LFTs within normal limits, cortisol 12, creatinine 1.48, WBC count 8.01, hemoglobin 14.9, .7, platelet count 188, ANC 4.91.  04/13/23: TSH 5.1882, T4 1.24, cr 1.56, alb 3.9, ca 9.53, LFTs WNL, wbc 9.75, hgb 14.8, plt 197, ANC 6.53  03/16/2023:   Hemoglobin 15.3, hematocrit 47%, WBC 10k platelets 173 K.  BUN 27 creatinine 1.84.  TSH is still very high 89 uI/ml  03.08/23: Cr 1.93, ca 9.52, alb 4.1, LFTs WNL, cortisol 8.6, TSH 79.73 T4 .50, wbc 9.08, hgb 14.9, plt 185, ANC 6.04  02/08/2023:  Creatinine 1.8, albumin 4.2, calcium 9.4, total bili 0.9, AST 18, ALT 15, TSH 25, cortisol 9.6, WBC 9.3, hemoglobin 14.8, .1, platelet count 192.  ANC 6.3.  Magnesium 2.1, phosphorus 3.3.  01/11/23: Cr 1.70, K+ 5.23, mag 2.1, phosphorous 4.7, LFTs WNL, TSH 98.9, Free T4 0.53, wbc 10.84, hgb 14.6, plt 190, ANC 7.48  12/14/2022-potassium 5.3, creatinine 1.74 WBC 10.05, hemoglobin 14.6, platelets 188, TSH 92.608, free T4 less than 0.42  11/16/2022:  Creatinine 1.6, albumin 3.8, calcium 9.5, alkaline phosphatase 100, total bili 0.4, AST 16, ALT 20, TSH 0.020, free T4 pending, WBC count 9.6, hemoglobin 13.7, .5, platelet count 202, ANC 6.36.     10/19/2022:  Creatinine 1.64, albumin 3.8, alkaline phosphatase 110, LFTs within normal limits, TSH 0.039, cortisol 4.9, WBC count 7.6, hemoglobin 13.6, .3, platelet count 191 000, ANC 4.75.    09/02/2022:  Creatinine 1.44, albumin 4.0, alkaline phosphatase 98, total bili 0.7, AST 18, ALT 18, WBC count 9.06, hemoglobin 14.5, platelet count 173.  ANC 5.70.     Past Medical History:   Diagnosis Date    Bladder cancer 2004    tumor removed    BPH (benign prostatic hyperplasia)     Carotid arterial disease     Carotid bruit     COPD (chronic obstructive pulmonary disease)     Enlarged prostate     Gynecomastia     Hyperlipidemia     Hypertension     Kidney stones     Mixed hyperlipidemia     Smoker     Urothelial carcinoma of kidney, right     Vitamin D deficiency        Past Surgical History:   Procedure Laterality Date    KNEE SURGERY      LAPAROSCOPIC ROBOT-ASSISTED SURGICAL REMOVAL OF KIDNEY USING DA JAZMIN XI  07/20/2022    SHOULDER SURGERY      STENT, CAROTID W/O PROTECT         Family History   Problem Relation  Name Age of Onset    Heart disease Mother      Heart disease Father      Emphysema Father      Heart disease Sister      Diabetes Paternal Grandmother         Social History     Socioeconomic History    Marital status:     Number of children: 2   Tobacco Use    Smoking status: Every Day     Current packs/day: 1.00     Types: Cigarettes    Smokeless tobacco: Never   Substance and Sexual Activity    Alcohol use: Never    Drug use: Never    Sexual activity: Not Currently       Current Outpatient Medications   Medication Sig Dispense Refill    albuterol (PROVENTIL) 2.5 mg /3 mL (0.083 %) nebulizer solution Take 2.5 mg by nebulization 4 (four) times daily as needed (up to 4 times daily as neeeded for couch, wheezing or for shortness of breath). Rescue      aspirin (ECOTRIN) 81 MG EC tablet Take 81 mg by mouth once daily.      atorvastatin (LIPITOR) 20 MG tablet Take 1 tablet (20 mg total) by mouth once daily. 90 tablet 1    benazepriL (LOTENSIN) 40 MG tablet Take 1 tablet (40 mg total) by mouth once daily. 90 tablet 1    diclofenac sodium (VOLTAREN) 1 % Gel Apply topically 4 (four) times daily. 100 g 0    finasteride (PROSCAR) 5 mg tablet Take 5 mg by mouth once daily.      levothyroxine (SYNTHROID) 112 MCG tablet Take 112 mcg by mouth before breakfast.      multivitamin (THERAGRAN) per tablet Take 1 tablet by mouth once daily.      tamsulosin (FLOMAX) 0.4 mg Cap Take 1 capsule (0.4 mg total) by mouth once daily. 90 capsule 0     No current facility-administered medications for this visit.       Review of patient's allergies indicates:   Allergen Reactions    Pravachol [pravastatin] Other (See Comments)     Muscle aches        Review of systems     CONSTITUTIONAL: no fevers, no chills, no weight loss, no fatigue, no weakness  HEMATOLOGIC: no abnormal bleeding, no abnormal bruising, no drenching night sweats  ONCOLOGIC: no new masses or lumps  HEENT: no vision loss, no tinnitus or hearing loss, no nose bleeding, no  dysphagia, no odynophagia  CVS: no chest pain, no palpitations, no dyspnea on exertion  RESP: no shortness of breath, no hemoptysis, no cough  GI: no nausea, no vomiting, no diarrhea, no constipation, no melena, no hematochezia, no hematemesis, no abdominal pain, no increase in abdominal girth  : no dysuria, no hematuria, no hesitancy, no scrotal swelling, no discharge  INTEGUMENT: no rashes, no abnormal bruising, no nail pitting, no hyperpigmentation  NEURO: no falls, no memory loss, no paresthesias or dysesthesias, no urofecal incontinence or retention, no loss of strength on any extremity  MSK: no back pain, no new joint pain, no joint swelling  PSYCH: no suicidal or homicidal ideation, no depression, no insomnia, no anhedonia  ENDOCRINE: no heat or cold intolerance, no polyuria, no polydipsia       Physical exam     Vitals:    07/09/24 1043   BP: 136/81   Pulse: 73   Resp: 14   Temp: 98 °F (36.7 °C)         ECOG PS 0  GA: AAOx3, NAD  HEENT: NCAT, PERRLA, EOMI, good dentition, moist oral mucous membranes  LYMPH: no cervical, axillary or supraclavicular adenopathy  CVS: s1s2 RRR, no M/R/G  RESP: CTA b/l, no crackles, no wheezes or rhonchi  ABD: soft, NT, ND, BS+, no hepatosplenomegaly  EXT: no deformities, no pedal edema  SKIN: no rashes, warm and dry  NEURO: normal mentation, strength 5/5 on all 4 extremities, no sensory deficits      Assessment:       # Urothelial carcinoma, right, upper ureter, low-grade, pT3 pNxMX, at least stage III  Negative margins, negative for lymphovascular invasion.    Of note patient had a diagnosis of non-muscle invasive bladder cancer in the past treated with resection followed by intravesical therapy.  Discussed with patient the diagnosis, staging and treatment recommendations including adjuvant therapy in the setting of T3 disease.    Patient is a relatively fit, currently works part-time.  He does have mild hearing loss.  Denies any symptoms of neuropathy.    Discussed the  recommendations to undergo adjuvant platinum-based chemotherapy given T3 disease after completion of staging workup.     Patient is hesitant to consider chemotherapy given his experience with his family members going through it.    Noted repeat CT chest abdomen pelvis IV contrast on 09/06/2022 without evidence of metastatic disease   Recommend adjuvant chemotherapy with gem cis.  Discussed the category 2B recommendation for nivolumab in adjuvant setting.  Patient would like to think about his options prior to making a decision.   He called our office to let us know that he would like to proceed with adjuvant nivolumab.    Initiated adjuvant nivolumab on 09/21/2022..  Patient missed 1 dose of nivolumab in between given issues with thyroid function  Patient completed adjuvant nivolumab in September 2023  Reviewed CT chest abdomen pelvis with contrast from May 2023 without evidence of disease  CT chest abdomen pelvis with contrast in September 2023 without evidence of disease  CT chest abdomen pelvis with contrast in December 2023 without evidence of disease  CT chest abdomen pelvis with contrast in April 2024 without evidence of disease  CT chest abdomen pelvis with contrast in June 2024 without evidence of disease    #Hypothyroidism    - noted very high TSH of 92.6 with free T4 of less than 0.42 while during previous visit was noted to have very low TSH of 0.020 (11/2022) & 0.039 (10/2022). Previously 2.83 (09/2022).  - this could be initially because of the thyroiditis after being started on autoimmune disease and now developed hypothyroidism.  Thus will start the patient on low-dose of levothyroxine for now and referred to endocrinology for further evaluation  - cortisol 6.6 (11/2022)  - Hypothyroidism currently being treated by Dr Aguilar, endocrinologist.     Plan:   Plan to follow-up in 4 months with repeat labs and CT chest abdomen pelvis with contrast for surveillance.  Plan for scans and labs q.3-4 months for  the 1st 2 years, q.6 months months thereafter to complete total of 5 years.  Continue follow-up with Dr. Aguilar for hypothyroidism.   Forward labs from today's visit to Dr. Aguilar's clinic.       Portions of the record may have been created with voice recognition software. Occasional wrong-word or sound-a-like substitutions may have occurred due to the inherent limitations of voice recognition software.

## 2024-07-26 ENCOUNTER — LAB VISIT (OUTPATIENT)
Dept: LAB | Facility: HOSPITAL | Age: 79
End: 2024-07-26
Attending: PHYSICAL MEDICINE & REHABILITATION
Payer: OTHER GOVERNMENT

## 2024-07-26 DIAGNOSIS — N18.9 CHRONIC KIDNEY DISEASE, UNSPECIFIED: Primary | ICD-10-CM

## 2024-07-26 LAB
ALBUMIN SERPL-MCNC: 3.7 G/DL (ref 3.4–4.8)
ALBUMIN/GLOB SERPL: 1.2 RATIO (ref 1.1–2)
ALP SERPL-CCNC: 116 UNIT/L (ref 40–150)
ALT SERPL-CCNC: 12 UNIT/L (ref 0–55)
ANION GAP SERPL CALC-SCNC: 4 MEQ/L
AST SERPL-CCNC: 12 UNIT/L (ref 5–34)
BACTERIA #/AREA URNS AUTO: NORMAL /HPF
BILIRUB SERPL-MCNC: 0.4 MG/DL
BILIRUB UR QL STRIP.AUTO: NEGATIVE
BUN SERPL-MCNC: 28.3 MG/DL (ref 8.4–25.7)
CALCIUM SERPL-MCNC: 9.5 MG/DL (ref 8.8–10)
CHLORIDE SERPL-SCNC: 112 MMOL/L (ref 98–107)
CLARITY UR: CLEAR
CO2 SERPL-SCNC: 24 MMOL/L (ref 23–31)
COLOR UR AUTO: NORMAL
CREAT SERPL-MCNC: 1.48 MG/DL (ref 0.73–1.18)
CREAT/UREA NIT SERPL: 19
GFR SERPLBLD CREATININE-BSD FMLA CKD-EPI: 49 ML/MIN/1.73/M2
GLOBULIN SER-MCNC: 3 GM/DL (ref 2.4–3.5)
GLUCOSE SERPL-MCNC: 98 MG/DL (ref 82–115)
GLUCOSE UR QL STRIP: NORMAL
HGB UR QL STRIP: NEGATIVE
KETONES UR QL STRIP: NEGATIVE
LEUKOCYTE ESTERASE UR QL STRIP: NEGATIVE
NITRITE UR QL STRIP: NEGATIVE
PH UR STRIP: 5.5 [PH]
POTASSIUM SERPL-SCNC: 4.8 MMOL/L (ref 3.5–5.1)
PROT SERPL-MCNC: 6.7 GM/DL (ref 5.8–7.6)
PROT UR QL STRIP: NEGATIVE
RBC #/AREA URNS AUTO: NORMAL /HPF
SODIUM SERPL-SCNC: 140 MMOL/L (ref 136–145)
SP GR UR STRIP.AUTO: 1.01 (ref 1–1.03)
SQUAMOUS #/AREA URNS LPF: NORMAL /HPF
UROBILINOGEN UR STRIP-ACNC: NORMAL
WBC #/AREA URNS AUTO: NORMAL /HPF

## 2024-07-26 PROCEDURE — 36415 COLL VENOUS BLD VENIPUNCTURE: CPT

## 2024-07-26 PROCEDURE — 81001 URINALYSIS AUTO W/SCOPE: CPT

## 2024-07-26 PROCEDURE — 80053 COMPREHEN METABOLIC PANEL: CPT

## 2024-08-02 ENCOUNTER — LAB VISIT (OUTPATIENT)
Dept: LAB | Facility: HOSPITAL | Age: 79
End: 2024-08-02
Attending: PHYSICAL MEDICINE & REHABILITATION
Payer: OTHER GOVERNMENT

## 2024-08-02 DIAGNOSIS — E04.9 ENLARGEMENT OF THYROID: Primary | ICD-10-CM

## 2024-08-02 DIAGNOSIS — N18.9 CHRONIC KIDNEY DISEASE, UNSPECIFIED: ICD-10-CM

## 2024-08-02 LAB
ALBUMIN SERPL-MCNC: 3.9 G/DL (ref 3.4–4.8)
ALBUMIN/GLOB SERPL: 1.1 RATIO (ref 1.1–2)
ALP SERPL-CCNC: 126 UNIT/L (ref 40–150)
ALT SERPL-CCNC: 12 UNIT/L (ref 0–55)
ANION GAP SERPL CALC-SCNC: 9 MEQ/L
AST SERPL-CCNC: 15 UNIT/L (ref 5–34)
BILIRUB SERPL-MCNC: 0.4 MG/DL
BILIRUB UR QL STRIP.AUTO: NEGATIVE
BUN SERPL-MCNC: 21.3 MG/DL (ref 8.4–25.7)
CALCIUM SERPL-MCNC: 10 MG/DL (ref 8.8–10)
CHLORIDE SERPL-SCNC: 111 MMOL/L (ref 98–107)
CLARITY UR: CLEAR
CO2 SERPL-SCNC: 24 MMOL/L (ref 23–31)
COLOR UR AUTO: NORMAL
CREAT SERPL-MCNC: 1.66 MG/DL (ref 0.73–1.18)
CREAT/UREA NIT SERPL: 13
GFR SERPLBLD CREATININE-BSD FMLA CKD-EPI: 42 ML/MIN/1.73/M2
GLOBULIN SER-MCNC: 3.5 GM/DL (ref 2.4–3.5)
GLUCOSE SERPL-MCNC: 104 MG/DL (ref 82–115)
GLUCOSE UR QL STRIP: NEGATIVE
HGB UR QL STRIP: NEGATIVE
KETONES UR QL STRIP: NEGATIVE
LEUKOCYTE ESTERASE UR QL STRIP: NEGATIVE
NITRITE UR QL STRIP: NEGATIVE
PH UR STRIP: 6 [PH]
POTASSIUM SERPL-SCNC: 5 MMOL/L (ref 3.5–5.1)
PROT SERPL-MCNC: 7.4 GM/DL (ref 5.8–7.6)
PROT UR QL STRIP: NEGATIVE
SODIUM SERPL-SCNC: 144 MMOL/L (ref 136–145)
SP GR UR STRIP.AUTO: 1.02 (ref 1–1.03)
T3FREE SERPL-MCNC: 2.68 PG/ML (ref 1.58–3.91)
T4 FREE SERPL-MCNC: 1.25 NG/DL (ref 0.7–1.48)
TSH SERPL-ACNC: 0.71 UIU/ML (ref 0.35–4.94)
UROBILINOGEN UR STRIP-ACNC: 0.2

## 2024-08-02 PROCEDURE — 84443 ASSAY THYROID STIM HORMONE: CPT

## 2024-08-02 PROCEDURE — 81003 URINALYSIS AUTO W/O SCOPE: CPT

## 2024-08-02 PROCEDURE — 84481 FREE ASSAY (FT-3): CPT

## 2024-08-02 PROCEDURE — 84439 ASSAY OF FREE THYROXINE: CPT

## 2024-08-02 PROCEDURE — 80053 COMPREHEN METABOLIC PANEL: CPT

## 2024-08-02 PROCEDURE — 36415 COLL VENOUS BLD VENIPUNCTURE: CPT

## 2024-11-12 ENCOUNTER — OFFICE VISIT (OUTPATIENT)
Dept: HEMATOLOGY/ONCOLOGY | Facility: CLINIC | Age: 79
End: 2024-11-12
Payer: MEDICARE

## 2024-11-12 VITALS
HEART RATE: 74 BPM | BODY MASS INDEX: 29.16 KG/M2 | TEMPERATURE: 98 F | SYSTOLIC BLOOD PRESSURE: 132 MMHG | OXYGEN SATURATION: 96 % | WEIGHT: 192.38 LBS | RESPIRATION RATE: 14 BRPM | HEIGHT: 68 IN | DIASTOLIC BLOOD PRESSURE: 78 MMHG

## 2024-11-12 DIAGNOSIS — C68.9 UROTHELIAL CARCINOMA: Primary | ICD-10-CM

## 2024-11-12 PROCEDURE — 99215 OFFICE O/P EST HI 40 MIN: CPT | Mod: ,,,

## 2024-11-12 NOTE — PROGRESS NOTES
Referring provider Dr. Hensley  Reason for consult:  Urothelial carcinoma    Diagnosis:  - Urothelial ca pT3 pNxMX, at least stage III     Treatment History:  Nephro ureterectomy on 23rd July 2022. Refused chemo.   09/21/2022- 09/07/2023: adjuvant Nivolumab    Current Treatment: :  Surveillance      HPI  Patient is a 76-year-old with history of hypertension and BPH who presented to the ER in May 2022 with symptoms of hematuria.  A CT at that point revealed an obstructing mass in the right renal pelvis for which he was seen by Dr. Hensley and underwent a diagnostic ureteroscopy, pathology from which revealed urothelial carcinoma.  Patient underwent a right ureteronephrectomy on 23rd July 2022.  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  Patient presented to clinic to establish care to discuss further disease management.    Patient was started on adjuvant nivolumab on 09/21/2022.    Patient is a current smoker, 25 pack year smoking history, denies alcohol or recreational drug use.  He currently works part-time, [employer].  Denies any family history of malignancy.  Lives with his wife.  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    INTERVAL HISTORY:     Today, 11/12/24, patient denies any acute concerns today.  He denies any symptoms of hematuria.  He denies any new lumps or bumps, decreased appetite or weight loss.  He denies any new medications, ER or hospital visits.  He reports close follow-up with endocrinology and Urology.    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  Margins free of tumor, lymphovascular invasion not seen.  No regional lymph nodes submitted.  PT3 pNX pGlow-grade    Imaging     11/06/24: CT  chest abdomen pelvis with contrast: No significant change. No evidence of residual or recurrent tumor. No evidence of metastatic diease to the abdomen or pelvis. Stable indeterminate hypodense lesions in the left kidney most likely cysts. Stable indeterminate hepatic hypodensities most likely cysts.   07/05/2024 CT chest abdomen pelvis with contrast no significant change in the chest since prior study.  No significant change since prior study in the abdomen or pelvis.  No evidence of local recurrence of tumor or metastatic disease.    04/03/24: CT chest abdomen pelvis with contrast:   No acute pulmonary disease, adenopathy or suspicious pulmonary nodules identified in the chest. No findings suspicious for residual or recurrent malignancy identified.      12/29/2023 CT chest abdomen pelvis with contrast:  No acute pulmonary disease, adenopathy or suspicious pulmonary nodules identified in the chest.  Status post right nephrectomy.  No findings suspicious for residual or recurrent malignancy identified.  Vascular calcifications producing stenosis in the left common iliac artery and external iliac arteries bilaterally.  Incidental small fat containing left inguinal hernia.    09/29/2023 CT chest abdomen pelvis with contrast:  Development of nonspecific mild left axillary lymphadenopathy.  Measuring up to 1.2 cm stable nonspecific bilateral pulmonary nodules, most of which are calcified, most likely secondary to old granulomatous disease.  No other significant change in the chest.  No significant change in the abdomen.  Status post right nephrectomy with no evidence of residual or recurrent tumor.  No evidence of metastatic disease to the abdomen or pelvis.  Stable indeterminate hepatic and left renal hypodensities most likely cyst or possible hemangiomas within the liver.    May 2022:  CT abdomen pelvis with contrast:  The filling defect in the right renal pelvis is most suggestive of a mass with neoplasm a concern.   This lesion also produces relatively delay in contrast opacification of the right kidney indicating partial urinary obstruction.    09/06/2022 CT chest abdomen pelvis with IV contrast:  No acute pulmonary disease, adenopathy or suspicious pulmonary nodules identified.  Is suspected cyst is visible in the right thyroid lobe, evaluation with dedicated ultrasound to be considered.  Status post right nephrectomy and apparent surgery involving the right lateral wall of the urinary bladder, no gross mucosal lesions visible in the bladder although there is relative wall thickening identified inferiorly and posteriorly.    05/16/2023 CT chest abdomen pelvis with contrast:  No evidence of neoplasm in the chest, small pericardial effusion stable.  Tiny hepatic cysts, prior right nephrectomy, atherosclerotic and degenerative changes, small umbilical hernia      LAB:     11/04/24: cr 1.62, alb 3.8, ca 9.75, LFTs WNL, K+ 5.16, wbc 9.31, hgb 14.8, plt 193, ANC 5.88, tsh 1.565  07/01/2024 creatinine 1.58, albumin 3.8, LFTs unremarkable, TSH 0.2, WBC count 9.9, hemoglobin 13.8, .9, platelet count 231, ANC 6.48        04/02/24: cr 1.49, alb 3.8, ca 9.52, LFTs WNL, TSH 0.201, wbc 9.07, hgb 15.3, plt 191, ANC 5.41  01/03/2024:  Creatinine 1.69, albumin 3.9, calcium 9.37, LFTs unremarkable, TSH 0.47, Free T4 1.27, WBC count 9.6, hemoglobin 14.5, .9, platelet count 199, ANC 6.0.    10/01/2023:  Creatinine 1.54, albumin 3.9, TSH 0.1778, cortisol 9.5, WBC count 10.5, hemoglobin 14.5, .5, platelet count 190, ANC 7.29.    09/06/2023: TSH 0.1245, alb 3.8, ca 9.49, LFTs WNL, cortisol 8.4, mag 2.0, phosphorus 2.8, wbc 10.56, hgb 14.4, plt 204, ANC 7.32  08/09/2023: TSH 0.2418, alb 4.0, ca 9.48, LFTs WNL, cortisol 8.1, wbc 8.89, hgb 14.8, plt 209, ANC 5.54  06/14/2023:  TSH 0.6, albumin 3.9, calcium 9.8, LFTs within normal limits, cortisol 8.9, creatinine 1.54, WBC count 9.01, hemoglobin 15.1, .3, platelet count 187,  ANC 6.12.  05/17/2023:  TSH 0.6, free T4 1.2, albumin 3.9, calcium 9.4, LFTs within normal limits, cortisol 12, creatinine 1.48, WBC count 8.01, hemoglobin 14.9, .7, platelet count 188, ANC 4.91.  04/13/23: TSH 5.1882, T4 1.24, cr 1.56, alb 3.9, ca 9.53, LFTs WNL, wbc 9.75, hgb 14.8, plt 197, ANC 6.53  03/16/2023:  Hemoglobin 15.3, hematocrit 47%, WBC 10k platelets 173 K.  BUN 27 creatinine 1.84.  TSH is still very high 89 uI/ml  03.08/23: Cr 1.93, ca 9.52, alb 4.1, LFTs WNL, cortisol 8.6, TSH 79.73 T4 .50, wbc 9.08, hgb 14.9, plt 185, ANC 6.04  02/08/2023:  Creatinine 1.8, albumin 4.2, calcium 9.4, total bili 0.9, AST 18, ALT 15, TSH 25, cortisol 9.6, WBC 9.3, hemoglobin 14.8, .1, platelet count 192.  ANC 6.3.  Magnesium 2.1, phosphorus 3.3.  01/11/23: Cr 1.70, K+ 5.23, mag 2.1, phosphorous 4.7, LFTs WNL, TSH 98.9, Free T4 0.53, wbc 10.84, hgb 14.6, plt 190, ANC 7.48  12/14/2022-potassium 5.3, creatinine 1.74 WBC 10.05, hemoglobin 14.6, platelets 188, TSH 92.608, free T4 less than 0.42  11/16/2022:  Creatinine 1.6, albumin 3.8, calcium 9.5, alkaline phosphatase 100, total bili 0.4, AST 16, ALT 20, TSH 0.020, free T4 pending, WBC count 9.6, hemoglobin 13.7, .5, platelet count 202, ANC 6.36.     10/19/2022:  Creatinine 1.64, albumin 3.8, alkaline phosphatase 110, LFTs within normal limits, TSH 0.039, cortisol 4.9, WBC count 7.6, hemoglobin 13.6, .3, platelet count 191 000, ANC 4.75.    09/02/2022:  Creatinine 1.44, albumin 4.0, alkaline phosphatase 98, total bili 0.7, AST 18, ALT 18, WBC count 9.06, hemoglobin 14.5, platelet count 173.  ANC 5.70.     Past Medical History:   Diagnosis Date    Bladder cancer 2004    tumor removed    BPH (benign prostatic hyperplasia)     Carotid arterial disease     Carotid bruit     COPD (chronic obstructive pulmonary disease)     Enlarged prostate     Gynecomastia     Hyperlipidemia     Hypertension     Kidney stones     Mixed hyperlipidemia     Smoker      Urothelial carcinoma of kidney, right     Vitamin D deficiency        Past Surgical History:   Procedure Laterality Date    KNEE SURGERY      LAPAROSCOPIC ROBOT-ASSISTED SURGICAL REMOVAL OF KIDNEY USING DA JAZMIN XI  07/20/2022    SHOULDER SURGERY      STENT, CAROTID W/O PROTECT         Family History   Problem Relation Name Age of Onset    Heart disease Mother      Heart disease Father      Emphysema Father      Heart disease Sister      Diabetes Paternal Grandmother         Social History     Socioeconomic History    Marital status:     Number of children: 2   Tobacco Use    Smoking status: Every Day     Current packs/day: 1.00     Types: Cigarettes    Smokeless tobacco: Never   Substance and Sexual Activity    Alcohol use: Never    Drug use: Never    Sexual activity: Not Currently       Current Outpatient Medications   Medication Sig Dispense Refill    albuterol (PROVENTIL) 2.5 mg /3 mL (0.083 %) nebulizer solution Take 2.5 mg by nebulization 4 (four) times daily as needed (up to 4 times daily as neeeded for couch, wheezing or for shortness of breath). Rescue      aspirin (ECOTRIN) 81 MG EC tablet Take 81 mg by mouth once daily.      atorvastatin (LIPITOR) 20 MG tablet Take 1 tablet (20 mg total) by mouth once daily. 90 tablet 1    benazepriL (LOTENSIN) 40 MG tablet Take 1 tablet (40 mg total) by mouth once daily. 90 tablet 1    diclofenac sodium (VOLTAREN) 1 % Gel Apply topically 4 (four) times daily. 100 g 0    finasteride (PROSCAR) 5 mg tablet Take 5 mg by mouth once daily.      levothyroxine (SYNTHROID) 112 MCG tablet Take 112 mcg by mouth before breakfast.      multivitamin (THERAGRAN) per tablet Take 1 tablet by mouth once daily.      tamsulosin (FLOMAX) 0.4 mg Cap Take 1 capsule (0.4 mg total) by mouth once daily. 90 capsule 0     No current facility-administered medications for this visit.       Review of patient's allergies indicates:   Allergen Reactions    Pravachol [pravastatin] Other (See  Comments)     Muscle aches        Review of systems     CONSTITUTIONAL: no fevers, no chills, no weight loss, no fatigue, no weakness  HEMATOLOGIC: no abnormal bleeding, no abnormal bruising, no drenching night sweats  ONCOLOGIC: no new masses or lumps  HEENT: no vision loss, no tinnitus or hearing loss, no nose bleeding, no dysphagia, no odynophagia  CVS: no chest pain, no palpitations, no dyspnea on exertion  RESP: no shortness of breath, no hemoptysis, no cough  GI: no nausea, no vomiting, no diarrhea, no constipation, no melena, no hematochezia, no hematemesis, no abdominal pain, no increase in abdominal girth  : no dysuria, no hematuria, no hesitancy, no scrotal swelling, no discharge  INTEGUMENT: no rashes, no abnormal bruising, no nail pitting, no hyperpigmentation  NEURO: no falls, no memory loss, no paresthesias or dysesthesias, no urofecal incontinence or retention, no loss of strength on any extremity  MSK: no back pain, no new joint pain, no joint swelling  PSYCH: no suicidal or homicidal ideation, no depression, no insomnia, no anhedonia  ENDOCRINE: no heat or cold intolerance, no polyuria, no polydipsia       Physical exam     Vitals:    11/12/24 1047   BP: 132/78   Pulse: 74   Resp: 14   Temp: 98 °F (36.7 °C)           ECOG PS 0  GA: AAOx3, NAD  HEENT: NCAT, PERRLA, EOMI, good dentition, moist oral mucous membranes  LYMPH: no cervical, axillary or supraclavicular adenopathy  CVS: s1s2 RRR, no M/R/G  RESP: CTA b/l, no crackles, no wheezes or rhonchi  ABD: soft, NT, ND, BS+, no hepatosplenomegaly  EXT: no deformities, no pedal edema  SKIN: no rashes, warm and dry  NEURO: normal mentation, strength 5/5 on all 4 extremities, no sensory deficits      Assessment:       # Urothelial carcinoma, right, upper ureter, low-grade, pT3 pNxMX, at least stage III  Negative margins, negative for lymphovascular invasion.    Of note patient had a diagnosis of non-muscle invasive bladder cancer in the past treated with  resection followed by intravesical therapy.  Discussed with patient the diagnosis, staging and treatment recommendations including adjuvant therapy in the setting of T3 disease.    Patient is a relatively fit, currently works part-time.  He does have mild hearing loss.  Denies any symptoms of neuropathy.    Discussed the recommendations to undergo adjuvant platinum-based chemotherapy given T3 disease after completion of staging workup.     Patient is hesitant to consider chemotherapy given his experience with his family members going through it.    Noted repeat CT chest abdomen pelvis IV contrast on 09/06/2022 without evidence of metastatic disease   Recommend adjuvant chemotherapy with gem cis.  Discussed the category 2B recommendation for nivolumab in adjuvant setting.  Patient would like to think about his options prior to making a decision.   He called our office to let us know that he would like to proceed with adjuvant nivolumab.    Initiated adjuvant nivolumab on 09/21/2022..  Patient missed 1 dose of nivolumab in between given issues with thyroid function  Patient completed adjuvant nivolumab in September 2023  Reviewed CT chest abdomen pelvis with contrast from May 2023 without evidence of disease  CT chest abdomen pelvis with contrast in September 2023 without evidence of disease  CT chest abdomen pelvis with contrast in December 2023 without evidence of disease  CT chest abdomen pelvis with contrast in April 2024 without evidence of disease  CT chest abdomen pelvis with contrast in June 2024 without evidence of disease  CT chest abdomen pelvis with contrast in November 2024 without evidence of disease    #Hypothyroidism    - noted very high TSH of 92.6 with free T4 of less than 0.42 while during previous visit was noted to have very low TSH of 0.020 (11/2022) & 0.039 (10/2022). Previously 2.83 (09/2022).  - this could be initially because of the thyroiditis after being started on autoimmune disease and  now developed hypothyroidism.  Thus will start the patient on low-dose of levothyroxine for now and referred to endocrinology for further evaluation  - cortisol 6.6 (11/2022)  - Hypothyroidism currently being treated by Dr Aguilar, endocrinologist.     Plan:   Plan to follow-up in 4 months with repeat labs and CT chest abdomen pelvis with contrast for surveillance.  Plan for scans and labs q.3-4 months for the 1st 2 years, q.6 months months thereafter to complete total of 5 years.  Continue follow-up with Dr. Aguilar for hypothyroidism.

## 2024-11-18 DIAGNOSIS — E78.5 HYPERLIPIDEMIA, UNSPECIFIED HYPERLIPIDEMIA TYPE: ICD-10-CM

## 2024-11-18 RX ORDER — ATORVASTATIN CALCIUM 20 MG/1
20 TABLET, FILM COATED ORAL DAILY
Qty: 90 TABLET | Refills: 1 | Status: SHIPPED | OUTPATIENT
Start: 2024-11-18

## 2024-11-25 PROCEDURE — 80061 LIPID PANEL: CPT | Performed by: FAMILY MEDICINE

## 2024-11-25 PROCEDURE — 83036 HEMOGLOBIN GLYCOSYLATED A1C: CPT | Performed by: FAMILY MEDICINE

## 2024-11-25 PROCEDURE — 82306 VITAMIN D 25 HYDROXY: CPT | Performed by: FAMILY MEDICINE

## 2024-11-25 PROCEDURE — 85025 COMPLETE CBC W/AUTO DIFF WBC: CPT | Performed by: FAMILY MEDICINE

## 2024-11-25 PROCEDURE — 84443 ASSAY THYROID STIM HORMONE: CPT | Performed by: FAMILY MEDICINE

## 2024-11-25 PROCEDURE — 80053 COMPREHEN METABOLIC PANEL: CPT | Performed by: FAMILY MEDICINE

## 2024-12-02 ENCOUNTER — OFFICE VISIT (OUTPATIENT)
Dept: FAMILY MEDICINE | Facility: CLINIC | Age: 79
End: 2024-12-02
Payer: MEDICARE

## 2024-12-02 VITALS
BODY MASS INDEX: 29.4 KG/M2 | WEIGHT: 194 LBS | HEIGHT: 68 IN | OXYGEN SATURATION: 96 % | SYSTOLIC BLOOD PRESSURE: 128 MMHG | DIASTOLIC BLOOD PRESSURE: 76 MMHG | RESPIRATION RATE: 20 BRPM | HEART RATE: 73 BPM | TEMPERATURE: 98 F

## 2024-12-02 DIAGNOSIS — D75.89 MACROCYTOSIS: ICD-10-CM

## 2024-12-02 DIAGNOSIS — I77.9 CAROTID ARTERY DISEASE, UNSPECIFIED LATERALITY, UNSPECIFIED TYPE: ICD-10-CM

## 2024-12-02 DIAGNOSIS — E87.5 HYPERKALEMIA: Primary | ICD-10-CM

## 2024-12-02 DIAGNOSIS — E78.2 MIXED HYPERLIPIDEMIA: ICD-10-CM

## 2024-12-02 RX ORDER — EZETIMIBE 10 MG/1
10 TABLET ORAL DAILY
Qty: 90 TABLET | Refills: 3 | Status: SHIPPED | OUTPATIENT
Start: 2024-12-02 | End: 2025-12-02

## 2024-12-02 NOTE — ASSESSMENT & PLAN NOTE
Asymptomatic   Has been adding liquid IV to water, we will stop  No trend upwards or downwards   Repeat CMP in 2 weeks

## 2024-12-02 NOTE — ASSESSMENT & PLAN NOTE
History of CVD plus CKD 3 equals extreme risk  Extreme risk equals LDL-C goal of 55  Current LDL-C 70, not at goal   Add Zetia 10 mg

## 2024-12-02 NOTE — PROGRESS NOTES
Family Medicine    Patient ID: [id number]     Chief Complaint: Follow-up (Lab results and pt started with a cough this morning. )      HPI:     Moise Rosado is a 79 y.o. male here today for labs    Past Medical History:   Diagnosis Date    Bladder cancer 2004    tumor removed    BPH (benign prostatic hyperplasia)     Carotid arterial disease     Carotid bruit     COPD (chronic obstructive pulmonary disease)     Enlarged prostate     Gynecomastia     Hyperlipidemia     Hypertension     Kidney stones     Mixed hyperlipidemia     Smoker     Urothelial carcinoma of kidney, right     Vitamin D deficiency         Past Surgical History:   Procedure Laterality Date    KNEE SURGERY      LAPAROSCOPIC ROBOT-ASSISTED SURGICAL REMOVAL OF KIDNEY USING DA JAZMIN XI  07/20/2022    SHOULDER SURGERY      STENT, CAROTID W/O PROTECT          Social History     Tobacco Use    Smoking status: Every Day     Current packs/day: 1.00     Types: Cigarettes    Smokeless tobacco: Never   Substance and Sexual Activity    Alcohol use: Never    Drug use: Never    Sexual activity: Not Currently        Current Outpatient Medications   Medication Instructions    albuterol (PROVENTIL) 2.5 mg, 4 times daily PRN    aspirin (ECOTRIN) 81 mg, Daily    atorvastatin (LIPITOR) 20 mg, Oral, Daily    benazepriL (LOTENSIN) 40 mg, Oral, Daily    diclofenac sodium (VOLTAREN) 1 % Gel Topical (Top), 4 times daily    ezetimibe (ZETIA) 10 mg, Oral, Daily    finasteride (PROSCAR) 5 mg, Daily    levothyroxine (SYNTHROID) 100 mcg, Before breakfast    multivitamin (THERAGRAN) per tablet 1 tablet, Daily    tamsulosin (FLOMAX) 0.4 mg, Oral, Daily       Review of patient's allergies indicates:   Allergen Reactions    Pravachol [pravastatin] Other (See Comments)     Muscle aches         Patient Care Team:  Dallas Iverson Sr., MD as PCP - General (Family Medicine)  Jed Hensley MD as Consulting Physician (Urology)  Rohan Aguilar MD as Consulting Physician  "(Endocrinology)  Audelia Montana MD as Consulting Physician (Oncology)  Samuel Wesley MD as Consulting Physician (General Surgery)     Subjective:     Review of Systems   Constitutional:  Negative for activity change, appetite change, fever and unexpected weight change.   HENT:  Negative for congestion, dental problem, hearing loss, rhinorrhea and trouble swallowing.    Eyes:  Negative for discharge and visual disturbance.   Respiratory:  Negative for chest tightness, shortness of breath and wheezing.    Cardiovascular:  Negative for chest pain, palpitations and leg swelling.   Gastrointestinal:  Negative for abdominal pain, blood in stool, constipation, diarrhea, nausea and vomiting.   Endocrine: Positive for polyuria. Negative for polydipsia.   Genitourinary:  Positive for difficulty urinating and urgency. Negative for hematuria.   Musculoskeletal:  Negative for arthralgias, gait problem, joint swelling and neck pain.   Skin:  Negative for rash and wound.   Neurological:  Negative for dizziness, syncope, speech difficulty, weakness, light-headedness and headaches.   Psychiatric/Behavioral:  Negative for confusion, dysphoric mood and suicidal ideas.        12 point review of systems conducted, negative except as stated in the history of present illness. See HPI for details.    Objective:     Visit Vitals  /76 (BP Location: Left arm, Patient Position: Sitting)   Pulse 73   Temp 98 °F (36.7 °C) (Skin)   Resp 20   Ht 5' 8" (1.727 m)   Wt 88 kg (194 lb)   SpO2 96%   BMI 29.50 kg/m²       Physical Exam  Vitals and nursing note reviewed.   Constitutional:       General: He is not in acute distress.     Appearance: Normal appearance. He is not ill-appearing, toxic-appearing or diaphoretic.   HENT:      Head: Normocephalic and atraumatic.      Right Ear: Tympanic membrane, ear canal and external ear normal. There is no impacted cerumen.      Left Ear: Tympanic membrane, ear canal and external ear normal. There " is no impacted cerumen.      Nose: No congestion or rhinorrhea.      Mouth/Throat:      Pharynx: Oropharynx is clear. No oropharyngeal exudate or posterior oropharyngeal erythema.   Eyes:      General:         Right eye: No discharge.         Left eye: No discharge.      Extraocular Movements: Extraocular movements intact.      Conjunctiva/sclera: Conjunctivae normal.   Cardiovascular:      Rate and Rhythm: Normal rate and regular rhythm.      Heart sounds: No murmur heard.     No friction rub. No gallop.   Pulmonary:      Effort: Pulmonary effort is normal. No respiratory distress.      Breath sounds: Normal breath sounds.   Musculoskeletal:      Right lower leg: No edema.      Left lower leg: No edema.   Skin:     Capillary Refill: Capillary refill takes less than 2 seconds.   Neurological:      Mental Status: He is alert and oriented to person, place, and time.   Psychiatric:         Mood and Affect: Mood normal.         Behavior: Behavior normal.         Thought Content: Thought content normal.         Labs Reviewed:     Chemistry:  Lab Results   Component Value Date     11/25/2024    K 5.5 (H) 11/25/2024    BUN 26.5 (H) 11/25/2024    CREATININE 1.61 (H) 11/25/2024    EGFRNORACEVR 44 11/25/2024    GLUCOSE 94 11/25/2024    CALCIUM 9.2 11/25/2024    ALKPHOS 101 11/25/2024    LABPROT 7.0 11/25/2024    ALBUMIN 3.8 11/25/2024    AST 18 11/25/2024    ALT 19 11/25/2024    OHDUQFRZ17JD 50 11/25/2024    TSH 1.755 11/25/2024    HUBEPV0UCTZ 1.25 08/02/2024        Lab Results   Component Value Date    HGBA1C 5.4 11/25/2024        Hematology:  Lab Results   Component Value Date    WBC 9.31 11/25/2024    HGB 14.8 11/25/2024    HCT 45.6 11/25/2024     11/25/2024       Lipid Panel:  Lab Results   Component Value Date    CHOL 140 11/25/2024    HDL 41 11/25/2024    LDL 70.00 11/25/2024    TRIG 143 (H) 11/25/2024    TOTALCHOLEST 3 11/25/2024        Urine:  Lab Results   Component Value Date    APPEARANCEUA Clear  08/02/2024    SGUA 1.020 08/02/2024    PROTEINUA Negative 08/02/2024    KETONESUA Negative 08/02/2024    LEUKOCYTESUR Negative 08/02/2024    RBCUA 0-5 07/26/2024    WBCUA 0-5 07/26/2024    BACTERIA None Seen 07/26/2024    SQEPUA Trace 07/26/2024       Assessment:       ICD-10-CM ICD-9-CM   1. Hyperkalemia  E87.5 276.7   2. Carotid artery disease, unspecified laterality, unspecified type  I77.9 447.9   3. Mixed hyperlipidemia  E78.2 272.2   4. Macrocytosis  D75.89 289.89        Plan:     1. Hyperkalemia  Overview:  Benazepril 40 mg daily   Has CKD stage 3    Assessment & Plan:  Asymptomatic   Has been adding liquid IV to water, we will stop  No trend upwards or downwards   Repeat CMP in 2 weeks    Orders:  -     Comprehensive Metabolic Panel; Future; Expected date: 12/16/2024  -     Comprehensive Metabolic Panel; Future; Expected date: 03/02/2025    2. Carotid artery disease, unspecified laterality, unspecified type  Overview:  Stent put in right carotid 4 months ago    Assessment & Plan:  Continue follow up with Dr. Silvio Neville as planned.       3. Mixed hyperlipidemia  Overview:  Atorvastatin 20 mg    Assessment & Plan:  History of CVD plus CKD 3 equals extreme risk  Extreme risk equals LDL-C goal of 55  Current LDL-C 70, not at goal   Add Zetia 10 mg    Orders:  -     ezetimibe (ZETIA) 10 mg tablet; Take 1 tablet (10 mg total) by mouth once daily.  Dispense: 90 tablet; Refill: 3  -     Lipid Panel; Future; Expected date: 03/02/2025    4. Macrocytosis  Overview:   with trivial anemia    Assessment & Plan:  B12 and folate in 2 weeks    Orders:  -     Vitamin B12; Future; Expected date: 12/16/2024  -     Folate; Future; Expected date: 12/16/2024  -     CBC Auto Differential; Future; Expected date: 03/02/2025               Declines all vaccines    Follow up in about 3 months (around 3/2/2025). In addition to their scheduled follow up, the patient has also been instructed to follow up on as needed  basis.     Future Appointments   Date Time Provider Department Center   12/16/2024 11:00 AM LAB, [facility]   3/12/2025 10:20 AM Audelia Montana MD [facility]        Nehemias Leal MD

## 2024-12-16 PROCEDURE — 80053 COMPREHEN METABOLIC PANEL: CPT | Performed by: FAMILY MEDICINE

## 2024-12-16 PROCEDURE — 82746 ASSAY OF FOLIC ACID SERUM: CPT | Performed by: FAMILY MEDICINE

## 2024-12-16 PROCEDURE — 82607 VITAMIN B-12: CPT | Performed by: FAMILY MEDICINE

## 2025-01-13 DIAGNOSIS — I10 HYPERTENSION, UNSPECIFIED TYPE: ICD-10-CM

## 2025-01-13 RX ORDER — BENAZEPRIL HYDROCHLORIDE 40 MG/1
40 TABLET ORAL DAILY
Qty: 90 TABLET | Refills: 1 | Status: SHIPPED | OUTPATIENT
Start: 2025-01-13

## 2025-02-26 DIAGNOSIS — E78.2 MIXED HYPERLIPIDEMIA: ICD-10-CM

## 2025-02-26 DIAGNOSIS — D75.89 MACROCYTOSIS: Primary | ICD-10-CM

## 2025-03-05 ENCOUNTER — OFFICE VISIT (OUTPATIENT)
Dept: FAMILY MEDICINE | Facility: CLINIC | Age: 80
End: 2025-03-05
Payer: MEDICARE

## 2025-03-05 VITALS
HEART RATE: 78 BPM | TEMPERATURE: 98 F | RESPIRATION RATE: 18 BRPM | OXYGEN SATURATION: 96 % | BODY MASS INDEX: 29.86 KG/M2 | HEIGHT: 68 IN | DIASTOLIC BLOOD PRESSURE: 74 MMHG | SYSTOLIC BLOOD PRESSURE: 137 MMHG | WEIGHT: 197 LBS

## 2025-03-05 DIAGNOSIS — Z12.5 ENCOUNTER FOR SCREENING FOR MALIGNANT NEOPLASM OF PROSTATE: ICD-10-CM

## 2025-03-05 DIAGNOSIS — N18.32 STAGE 3B CHRONIC KIDNEY DISEASE: ICD-10-CM

## 2025-03-05 DIAGNOSIS — E78.2 MIXED HYPERLIPIDEMIA: ICD-10-CM

## 2025-03-05 DIAGNOSIS — R79.9 ABNORMAL FINDING OF BLOOD CHEMISTRY, UNSPECIFIED: ICD-10-CM

## 2025-03-05 DIAGNOSIS — I10 PRIMARY HYPERTENSION: Primary | ICD-10-CM

## 2025-03-05 RX ORDER — LEVOTHYROXINE SODIUM 100 UG/1
100 TABLET ORAL
COMMUNITY
Start: 2025-02-19

## 2025-03-05 NOTE — ASSESSMENT & PLAN NOTE
Potassium 5.6 today   He will bring his labs to his nephrologists and they will handle   Repeat three-month

## 2025-03-05 NOTE — PROGRESS NOTES
Family Medicine    Patient ID: [id number]     Chief Complaint: lab results      HPI:     Moise Rosado is a 79 y.o. male here today for lab results.    Past Medical History:   Diagnosis Date    Bladder cancer 2004    tumor removed    BPH (benign prostatic hyperplasia)     Carotid arterial disease     Carotid bruit     COPD (chronic obstructive pulmonary disease)     Enlarged prostate     Gynecomastia     Hyperlipidemia     Hypertension     Kidney stones     Mixed hyperlipidemia     Smoker     Urothelial carcinoma of kidney, right     Vitamin D deficiency         Past Surgical History:   Procedure Laterality Date    KNEE SURGERY      LAPAROSCOPIC ROBOT-ASSISTED SURGICAL REMOVAL OF KIDNEY USING DA JAZMIN XI  07/20/2022    SHOULDER SURGERY      STENT, CAROTID W/O PROTECT          Social History     Tobacco Use    Smoking status: Every Day     Current packs/day: 1.00     Types: Cigarettes    Smokeless tobacco: Never   Substance and Sexual Activity    Alcohol use: Never    Drug use: Never    Sexual activity: Not Currently        Current Outpatient Medications   Medication Instructions    albuterol (PROVENTIL) 2.5 mg, 4 times daily PRN    aspirin (ECOTRIN) 81 mg, Daily    atorvastatin (LIPITOR) 20 mg, Oral, Daily    benazepriL (LOTENSIN) 40 mg, Oral, Daily    diclofenac sodium (VOLTAREN) 1 % Gel Topical (Top), 4 times daily    ezetimibe (ZETIA) 10 mg, Oral, Daily    finasteride (PROSCAR) 5 mg, Daily    levothyroxine (SYNTHROID) 100 mcg, Before breakfast    levothyroxine (SYNTHROID) 100 mcg    multivitamin (THERAGRAN) per tablet 1 tablet, Daily    tamsulosin (FLOMAX) 0.4 mg, Oral, Daily       Review of patient's allergies indicates:   Allergen Reactions    Pravachol [pravastatin] Other (See Comments)     Muscle aches         Patient Care Team:  Dallas Iverson Sr., MD as PCP - General (Family Medicine)  Jed Hensley MD as Consulting Physician (Urology)  Rohan Aguilar MD as Consulting Physician  "(Endocrinology)  Audelia Montana MD as Consulting Physician (Oncology)  Samuel Wesley MD as Consulting Physician (General Surgery)     Subjective:     Review of Systems   Constitutional:  Negative for activity change, appetite change, fever and unexpected weight change.   HENT:  Negative for congestion, dental problem, rhinorrhea and trouble swallowing.    Eyes:  Negative for visual disturbance.   Respiratory:  Negative for chest tightness and shortness of breath.    Cardiovascular:  Negative for chest pain, palpitations and leg swelling.   Gastrointestinal:  Negative for abdominal pain, blood in stool, constipation, diarrhea, nausea and vomiting.   Genitourinary:  Negative for difficulty urinating.   Musculoskeletal:  Negative for gait problem.   Skin:  Negative for rash and wound.   Neurological:  Negative for dizziness, syncope, speech difficulty, weakness and light-headedness.   Psychiatric/Behavioral:  Negative for confusion and suicidal ideas.        12 point review of systems conducted, negative except as stated in the history of present illness. See HPI for details.    Objective:     Visit Vitals  /74   Pulse 78   Temp 98.1 °F (36.7 °C) (Skin)   Resp 18   Ht 5' 8" (1.727 m)   Wt 89.4 kg (197 lb)   SpO2 96%   BMI 29.95 kg/m²       Physical Exam  Vitals and nursing note reviewed.   Constitutional:       General: He is not in acute distress.     Appearance: Normal appearance. He is not ill-appearing, toxic-appearing or diaphoretic.   HENT:      Head: Normocephalic and atraumatic.      Right Ear: Tympanic membrane, ear canal and external ear normal. There is no impacted cerumen.      Left Ear: Tympanic membrane, ear canal and external ear normal. There is no impacted cerumen.      Nose: No congestion or rhinorrhea.      Mouth/Throat:      Pharynx: Oropharynx is clear. No oropharyngeal exudate or posterior oropharyngeal erythema.   Eyes:      General:         Right eye: No discharge.         Left " eye: No discharge.      Extraocular Movements: Extraocular movements intact.      Conjunctiva/sclera: Conjunctivae normal.   Cardiovascular:      Rate and Rhythm: Normal rate and regular rhythm.      Heart sounds: No murmur heard.     No friction rub. No gallop.   Pulmonary:      Effort: Pulmonary effort is normal. No respiratory distress.      Breath sounds: Normal breath sounds.   Musculoskeletal:      Right lower leg: No edema.      Left lower leg: No edema.   Skin:     Capillary Refill: Capillary refill takes less than 2 seconds.   Neurological:      Mental Status: He is alert and oriented to person, place, and time.   Psychiatric:         Mood and Affect: Mood normal.         Behavior: Behavior normal.         Thought Content: Thought content normal.         Labs Reviewed:     Chemistry:  Lab Results   Component Value Date     12/16/2024    K 4.8 12/16/2024    BUN 27.5 (H) 12/16/2024    CREATININE 1.50 (H) 12/16/2024    EGFRNORACEVR 48 12/16/2024    GLUCOSE 90 12/16/2024    CALCIUM 9.2 12/16/2024    ALKPHOS 113 12/16/2024    LABPROT 7.0 12/16/2024    ALBUMIN 3.7 12/16/2024    AST 19 12/16/2024    ALT 15 12/16/2024    [id number] 50 11/25/2024    TSH 1.755 11/25/2024    YUBDWA2YXGI 1.25 08/02/2024        Lab Results   Component Value Date    HGBA1C 5.4 11/25/2024        Hematology:  Lab Results   Component Value Date    WBC 9.31 11/25/2024    HGB 14.8 11/25/2024    HCT 45.6 11/25/2024     11/25/2024       Lipid Panel:  Lab Results   Component Value Date    CHOL 140 11/25/2024    HDL 41 11/25/2024    LDL 70.00 11/25/2024    TRIG 143 (H) 11/25/2024    TOTALCHOLEST 3 11/25/2024        Urine:  Lab Results   Component Value Date    APPEARANCEUA Clear 08/02/2024    SGUA 1.020 08/02/2024    PROTEINUA Negative 08/02/2024    KETONESUA Negative 08/02/2024    LEUKOCYTESUR Negative 08/02/2024    RBCUA 0-5 07/26/2024    WBCUA 0-5 07/26/2024    BACTERIA None Seen 07/26/2024    SQEPUA Trace 07/26/2024         Assessment:       ICD-10-CM ICD-9-CM   1. Primary hypertension  I10 401.9   2. Mixed hyperlipidemia  E78.2 272.2   3. Stage 3b chronic kidney disease  N18.32 585.3   4. Abnormal finding of blood chemistry, unspecified  R79.9 790.6   5. Encounter for screening for malignant neoplasm of prostate  Z12.5 V76.44        Plan:     1. Primary hypertension  Overview:  Continue with Aspirin 81 mg daily + Benazepril 40 mg daily.  Patient is well controlled.  Low Sodium Diet (DASH Diet - Less than 2 grams of sodium per day).  Monitor blood pressure daily and log. Report consistent numbers greater than 140/90.  Maintain healthy weight with goal BMI <30. Exercise 30 minutes per day, 5 days per week.    Assessment & Plan:  At goal   Continue above medication at same dose    Orders:  -     CBC Auto Differential; Future; Expected date: 06/05/2025  -     Comprehensive Metabolic Panel; Future; Expected date: 06/05/2025  -     Lipid Panel; Future; Expected date: 06/05/2025  -     TSH; Future; Expected date: 06/05/2025  -     Hemoglobin A1C; Future; Expected date: 06/05/2025  -     Urinalysis; Future; Expected date: 06/05/2025  -     PSA, Screening; Future; Expected date: 06/05/2025    2. Mixed hyperlipidemia  Overview:  Atorvastatin 20 mg  Zetia 10 mg    History of CVD plus CKD 3 equals extreme risk category, LDL-C goal is below 55    Assessment & Plan:  Lipids not back today   We will call with results      Role of apolipoprotein B in the clinical management of cardiovascular risk in adults: An Expert Clinical Consensus from the National Lipid Association.   Journal of Clinical Lipidology        Orders:  -     CBC Auto Differential; Future; Expected date: 06/05/2025  -     Comprehensive Metabolic Panel; Future; Expected date: 06/05/2025  -     Lipid Panel; Future; Expected date: 06/05/2025  -     TSH; Future; Expected date: 06/05/2025  -     Hemoglobin A1C; Future; Expected date: 06/05/2025  -     Urinalysis; Future; Expected  date: 06/05/2025  -     PSA, Screening; Future; Expected date: 06/05/2025    3. Stage 3b chronic kidney disease  Overview:  Follows Nephrology   Started taking potassium lowering medication one-month ago    Assessment & Plan:  Potassium 5.6 today   He will bring his labs to his nephrologists and they will handle   Repeat three-month    Orders:  -     CBC Auto Differential; Future; Expected date: 06/05/2025  -     Comprehensive Metabolic Panel; Future; Expected date: 06/05/2025  -     Lipid Panel; Future; Expected date: 06/05/2025  -     TSH; Future; Expected date: 06/05/2025  -     Hemoglobin A1C; Future; Expected date: 06/05/2025  -     Urinalysis; Future; Expected date: 06/05/2025  -     PSA, Screening; Future; Expected date: 06/05/2025    4. Abnormal finding of blood chemistry, unspecified  -     Hemoglobin A1C; Future; Expected date: 06/05/2025    5. Encounter for screening for malignant neoplasm of prostate  -     PSA, Screening; Future; Expected date: 06/05/2025       He also has neutrophilia but this is followed closely by his hematology/oncologist who he sees in a few weeks.  Follow up in about 3 months (around 6/5/2025). In addition to their scheduled follow up, the patient has also been instructed to follow up on as needed basis.     Future Appointments   Date Time Provider Department Center   3/12/2025 10:20 AM Audelia Montana MD [facility]   6/3/2025  9:20 AM LAB, [facility] MD

## 2025-03-05 NOTE — ASSESSMENT & PLAN NOTE
Lipids not back today   We will call with results      Role of apolipoprotein B in the clinical management of cardiovascular risk in adults: An Expert Clinical Consensus from the National Lipid Association.   Journal of Clinical Lipidology

## 2025-03-12 ENCOUNTER — OFFICE VISIT (OUTPATIENT)
Dept: HEMATOLOGY/ONCOLOGY | Facility: CLINIC | Age: 80
End: 2025-03-12
Payer: MEDICARE

## 2025-03-12 VITALS
TEMPERATURE: 98 F | BODY MASS INDEX: 29.68 KG/M2 | WEIGHT: 195.81 LBS | RESPIRATION RATE: 14 BRPM | DIASTOLIC BLOOD PRESSURE: 83 MMHG | HEIGHT: 68 IN | OXYGEN SATURATION: 96 % | SYSTOLIC BLOOD PRESSURE: 148 MMHG | HEART RATE: 79 BPM

## 2025-03-12 DIAGNOSIS — C68.9 UROTHELIAL CARCINOMA: Primary | ICD-10-CM

## 2025-03-12 DIAGNOSIS — N32.89 BLADDER MASS: ICD-10-CM

## 2025-03-12 DIAGNOSIS — R91.8 LUNG MASS: ICD-10-CM

## 2025-03-12 PROCEDURE — 99214 OFFICE O/P EST MOD 30 MIN: CPT | Mod: ,,, | Performed by: STUDENT IN AN ORGANIZED HEALTH CARE EDUCATION/TRAINING PROGRAM

## 2025-03-12 PROCEDURE — G2211 COMPLEX E/M VISIT ADD ON: HCPCS | Mod: ,,, | Performed by: STUDENT IN AN ORGANIZED HEALTH CARE EDUCATION/TRAINING PROGRAM

## 2025-03-12 NOTE — PROGRESS NOTES
Referring provider Dr. Hensley  Reason for consult:  Urothelial carcinoma    Diagnosis:  - Urothelial ca pT3 pNxMX, at least stage III     Treatment History:    Nephro ureterectomy on 23rd July 2022. Refused chemo.   09/21/2022- 09/07/2023: adjuvant Nivolumab    Current Treatment: :  Surveillance      HPI  Patient is a 79-year-old with history of hypertension and BPH who presented to the ER in May 2022 with symptoms of hematuria.  A CT at that point revealed an obstructing mass in the right renal pelvis for which he was seen by Dr. Hensley and underwent a diagnostic ureteroscopy, pathology from which revealed urothelial carcinoma.  Patient underwent a right ureteronephrectomy on 23rd July 2022.  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  Patient presented to clinic to establish care to discuss further disease management.    Patient was started on adjuvant nivolumab on 09/21/2022.    Patient is a current smoker, 25 pack year smoking history, denies alcohol or recreational drug use.  He currently works part-time, [employer].  Denies any family history of malignancy.  Lives with his wife.  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    INTERVAL HISTORY:     Today, 03/12/2025, patient denies any acute concerns today.  He reports 1 episode of hematuria few months back which resolve without intervention and without recurrence.  He denies any new foci of pain, decreased appetite or weight loss.  He denies any new medications, ER or hospital visits since his last follow-up visit with us.    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  Margins free of tumor, lymphovascular invasion not seen.  No regional lymph nodes  submitted.  PT3 pNX pGlow-grade    Imaging       03/07/2025 CT chest abdomen pelvis with contrast:  Emphysematous changes present in the lungs with multiple punctate calcified and some noncalcified nodules.  There was 1 noncalcified nodule not seen on previous study seen in the right upper lobe.  Evidence appearance is nonspecific and continued follow-up of this lesion is recommended.  Prominent atherosclerotic changes seen in the aorta, iliac in coronary arteries.  Prior right nephrectomy with no evidence of metastatic disease in the abdomen or pelvis.  Possible bladder mass versus encroachment of the prostate deforming the floor of the bladder.    11/06/24: CT chest abdomen pelvis with contrast: No significant change. No evidence of residual or recurrent tumor. No evidence of metastatic diease to the abdomen or pelvis. Stable indeterminate hypodense lesions in the left kidney most likely cysts. Stable indeterminate hepatic hypodensities most likely cysts.     07/05/2024 CT chest abdomen pelvis with contrast no significant change in the chest since prior study.  No significant change since prior study in the abdomen or pelvis.  No evidence of local recurrence of tumor or metastatic disease.    04/03/24: CT chest abdomen pelvis with contrast:   No acute pulmonary disease, adenopathy or suspicious pulmonary nodules identified in the chest. No findings suspicious for residual or recurrent malignancy identified.      12/29/2023 CT chest abdomen pelvis with contrast:  No acute pulmonary disease, adenopathy or suspicious pulmonary nodules identified in the chest.  Status post right nephrectomy.  No findings suspicious for residual or recurrent malignancy identified.  Vascular calcifications producing stenosis in the left common iliac artery and external iliac arteries bilaterally.  Incidental small fat containing left inguinal hernia.    09/29/2023 CT chest abdomen pelvis with contrast:  Development of nonspecific mild  left axillary lymphadenopathy.  Measuring up to 1.2 cm stable nonspecific bilateral pulmonary nodules, most of which are calcified, most likely secondary to old granulomatous disease.  No other significant change in the chest.  No significant change in the abdomen.  Status post right nephrectomy with no evidence of residual or recurrent tumor.  No evidence of metastatic disease to the abdomen or pelvis.  Stable indeterminate hepatic and left renal hypodensities most likely cyst or possible hemangiomas within the liver.    May 2022:  CT abdomen pelvis with contrast:  The filling defect in the right renal pelvis is most suggestive of a mass with neoplasm a concern.  This lesion also produces relatively delay in contrast opacification of the right kidney indicating partial urinary obstruction.    09/06/2022 CT chest abdomen pelvis with IV contrast:  No acute pulmonary disease, adenopathy or suspicious pulmonary nodules identified.  Is suspected cyst is visible in the right thyroid lobe, evaluation with dedicated ultrasound to be considered.  Status post right nephrectomy and apparent surgery involving the right lateral wall of the urinary bladder, no gross mucosal lesions visible in the bladder although there is relative wall thickening identified inferiorly and posteriorly.    05/16/2023 CT chest abdomen pelvis with contrast:  No evidence of neoplasm in the chest, small pericardial effusion stable.  Tiny hepatic cysts, prior right nephrectomy, atherosclerotic and degenerative changes, small umbilical hernia      LAB:     03/03/2025 creatinine 1.6, LFTs unremarkable, WBC count 8.9, hemoglobin 16.1, .8, platelet count 186, ANC 5.8    11/04/24: cr 1.62, alb 3.8, ca 9.75, LFTs WNL, K+ 5.16, wbc 9.31, hgb 14.8, plt 193, ANC 5.88, tsh 1.565  07/01/2024 creatinine 1.58, albumin 3.8, LFTs unremarkable, TSH 0.2, WBC count 9.9, hemoglobin 13.8, .9, platelet count 231, ANC 6.48        04/02/24: cr 1.49, alb 3.8, ca  9.52, LFTs WNL, TSH 0.201, wbc 9.07, hgb 15.3, plt 191, ANC 5.41  01/03/2024:  Creatinine 1.69, albumin 3.9, calcium 9.37, LFTs unremarkable, TSH 0.47, Free T4 1.27, WBC count 9.6, hemoglobin 14.5, .9, platelet count 199, ANC 6.0.    10/01/2023:  Creatinine 1.54, albumin 3.9, TSH 0.1778, cortisol 9.5, WBC count 10.5, hemoglobin 14.5, .5, platelet count 190, ANC 7.29.    09/06/2023: TSH 0.1245, alb 3.8, ca 9.49, LFTs WNL, cortisol 8.4, mag 2.0, phosphorus 2.8, wbc 10.56, hgb 14.4, plt 204, ANC 7.32  08/09/2023: TSH 0.2418, alb 4.0, ca 9.48, LFTs WNL, cortisol 8.1, wbc 8.89, hgb 14.8, plt 209, ANC 5.54  06/14/2023:  TSH 0.6, albumin 3.9, calcium 9.8, LFTs within normal limits, cortisol 8.9, creatinine 1.54, WBC count 9.01, hemoglobin 15.1, .3, platelet count 187, ANC 6.12.  05/17/2023:  TSH 0.6, free T4 1.2, albumin 3.9, calcium 9.4, LFTs within normal limits, cortisol 12, creatinine 1.48, WBC count 8.01, hemoglobin 14.9, .7, platelet count 188, ANC 4.91.  04/13/23: TSH 5.1882, T4 1.24, cr 1.56, alb 3.9, ca 9.53, LFTs WNL, wbc 9.75, hgb 14.8, plt 197, ANC 6.53  03/16/2023:  Hemoglobin 15.3, hematocrit 47%, WBC 10k platelets 173 K.  BUN 27 creatinine 1.84.  TSH is still very high 89 uI/ml  03.08/23: Cr 1.93, ca 9.52, alb 4.1, LFTs WNL, cortisol 8.6, TSH 79.73 T4 .50, wbc 9.08, hgb 14.9, plt 185, ANC 6.04  02/08/2023:  Creatinine 1.8, albumin 4.2, calcium 9.4, total bili 0.9, AST 18, ALT 15, TSH 25, cortisol 9.6, WBC 9.3, hemoglobin 14.8, .1, platelet count 192.  ANC 6.3.  Magnesium 2.1, phosphorus 3.3.  01/11/23: Cr 1.70, K+ 5.23, mag 2.1, phosphorous 4.7, LFTs WNL, TSH 98.9, Free T4 0.53, wbc 10.84, hgb 14.6, plt 190, ANC 7.48  12/14/2022-potassium 5.3, creatinine 1.74 WBC 10.05, hemoglobin 14.6, platelets 188, TSH 92.608, free T4 less than 0.42  11/16/2022:  Creatinine 1.6, albumin 3.8, calcium 9.5, alkaline phosphatase 100, total bili 0.4, AST 16, ALT 20, TSH 0.020, free T4  pending, WBC count 9.6, hemoglobin 13.7, .5, platelet count 202, ANC 6.36.     10/19/2022:  Creatinine 1.64, albumin 3.8, alkaline phosphatase 110, LFTs within normal limits, TSH 0.039, cortisol 4.9, WBC count 7.6, hemoglobin 13.6, .3, platelet count 191 000, ANC 4.75.    09/02/2022:  Creatinine 1.44, albumin 4.0, alkaline phosphatase 98, total bili 0.7, AST 18, ALT 18, WBC count 9.06, hemoglobin 14.5, platelet count 173.  ANC 5.70.     Past Medical History:   Diagnosis Date    Bladder cancer 2004    tumor removed    BPH (benign prostatic hyperplasia)     Carotid arterial disease     Carotid bruit     COPD (chronic obstructive pulmonary disease)     Enlarged prostate     Gynecomastia     Hyperlipidemia     Hypertension     Kidney stones     Mixed hyperlipidemia     Smoker     Urothelial carcinoma of kidney, right     Vitamin D deficiency        Past Surgical History:   Procedure Laterality Date    KNEE SURGERY      LAPAROSCOPIC ROBOT-ASSISTED SURGICAL REMOVAL OF KIDNEY USING DA JAZMIN XI  07/20/2022    SHOULDER SURGERY      STENT, CAROTID W/O PROTECT         Family History   Problem Relation Name Age of Onset    Heart disease Mother      Heart disease Father      Emphysema Father      Heart disease Sister      Diabetes Paternal Grandmother         Social History     Socioeconomic History    Marital status:     Number of children: 2   Tobacco Use    Smoking status: Every Day     Current packs/day: 1.00     Types: Cigarettes    Smokeless tobacco: Never   Substance and Sexual Activity    Alcohol use: Never    Drug use: Never    Sexual activity: Not Currently     Social Drivers of Health     Financial Resource Strain: Low Risk  (3/5/2025)    Overall Financial Resource Strain (CARDIA)     Difficulty of Paying Living Expenses: Not hard at all   Food Insecurity: No Food Insecurity (3/5/2025)    Hunger Vital Sign     Worried About Running Out of Food in the Last Year: Never true     Ran Out of Food in  the Last Year: Never true   Transportation Needs: No Transportation Needs (3/5/2025)    PRAPARE - Transportation     Lack of Transportation (Medical): No     Lack of Transportation (Non-Medical): No   Physical Activity: Insufficiently Active (3/5/2025)    Exercise Vital Sign     Days of Exercise per Week: 1 day     Minutes of Exercise per Session: 20 min   Stress: No Stress Concern Present (3/5/2025)    [employer] - Occupational Stress Questionnaire     Feeling of Stress : Not at all   Housing Stability: Low Risk  (3/5/2025)    Housing Stability Vital Sign     Unable to Pay for Housing in the Last Year: No     Homeless in the Last Year: No       Current Outpatient Medications   Medication Sig Dispense Refill    albuterol (PROVENTIL) 2.5 mg /3 mL (0.083 %) nebulizer solution Take 2.5 mg by nebulization 4 (four) times daily as needed (up to 4 times daily as neeeded for couch, wheezing or for shortness of breath). Rescue      aspirin (ECOTRIN) 81 MG EC tablet Take 81 mg by mouth once daily.      atorvastatin (LIPITOR) 20 MG tablet Take 1 tablet (20 mg total) by mouth once daily. 90 tablet 1    benazepriL (LOTENSIN) 40 MG tablet Take 1 tablet (40 mg total) by mouth once daily. 90 tablet 1    diclofenac sodium (VOLTAREN) 1 % Gel Apply topically 4 (four) times daily. 100 g 0    ezetimibe (ZETIA) 10 mg tablet Take 1 tablet (10 mg total) by mouth once daily. 90 tablet 3    finasteride (PROSCAR) 5 mg tablet Take 5 mg by mouth once daily.      levothyroxine (SYNTHROID) 100 MCG tablet Take 100 mcg by mouth.      multivitamin (THERAGRAN) per tablet Take 1 tablet by mouth once daily.      sodium zirconium cyclosilicate (LOKELMA) 10 gram packet Take 10 g by mouth every other day. Mix entire contents of packet(s) into drinking glass containing 3 tablespoons of water; stir well and drink immediately. Add water and repeat until no powder remains to receive entire dose.      tamsulosin (FLOMAX) 0.4 mg  Cap Take 1 capsule (0.4 mg total) by mouth once daily. 90 capsule 0    levothyroxine (SYNTHROID) 112 MCG tablet Take 100 mcg by mouth before breakfast. (Patient not taking: Reported on 3/12/2025)       No current facility-administered medications for this visit.       Review of patient's allergies indicates:   Allergen Reactions    Pravachol [pravastatin] Other (See Comments)     Muscle aches        Review of systems     CONSTITUTIONAL: no fevers, no chills, no weight loss, no fatigue, no weakness  HEMATOLOGIC: no abnormal bleeding, no abnormal bruising, no drenching night sweats  ONCOLOGIC: no new masses or lumps  HEENT: no vision loss, no tinnitus or hearing loss, no nose bleeding, no dysphagia, no odynophagia  CVS: no chest pain, no palpitations, no dyspnea on exertion  RESP: no shortness of breath, no hemoptysis, no cough  GI: no nausea, no vomiting, no diarrhea, no constipation, no melena, no hematochezia, no hematemesis, no abdominal pain, no increase in abdominal girth  : no dysuria, no hematuria, no hesitancy, no scrotal swelling, no discharge  INTEGUMENT: no rashes, no abnormal bruising, no nail pitting, no hyperpigmentation  NEURO: no falls, no memory loss, no paresthesias or dysesthesias, no urofecal incontinence or retention, no loss of strength on any extremity  MSK: no back pain, no new joint pain, no joint swelling  PSYCH: no suicidal or homicidal ideation, no depression, no insomnia, no anhedonia  ENDOCRINE: no heat or cold intolerance, no polyuria, no polydipsia       Physical exam     Vitals:    03/12/25 1018   BP: (!) 148/83   Pulse: 79   Resp: 14   Temp: 97.8 °F (36.6 °C)           ECOG PS 0  GA: AAOx3, NAD  HEENT: NCAT, PERRLA, EOMI, good dentition, moist oral mucous membranes  LYMPH: no cervical, axillary or supraclavicular adenopathy  CVS: s1s2 RRR, no M/R/G  RESP: CTA b/l, no crackles, no wheezes or rhonchi  ABD: soft, NT, ND, BS+, no hepatosplenomegaly  EXT: no deformities, no pedal  edema  SKIN: no rashes, warm and dry  NEURO: normal mentation, strength 5/5 on all 4 extremities, no sensory deficits      Assessment:       # Urothelial carcinoma, right, upper ureter, low-grade, pT3 pNxMX, at least stage III  Negative margins, negative for lymphovascular invasion.    Of note patient had a diagnosis of non-muscle invasive bladder cancer in the past treated with resection followed by intravesical therapy.  Discussed with patient the diagnosis, staging and treatment recommendations including adjuvant therapy in the setting of T3 disease.    Patient is a relatively fit, currently works part-time.  He does have mild hearing loss.  Denies any symptoms of neuropathy.    Discussed the recommendations to undergo adjuvant platinum-based chemotherapy given T3 disease after completion of staging workup.     Patient is hesitant to consider chemotherapy given his experience with his family members going through it.    Noted repeat CT chest abdomen pelvis IV contrast on 09/06/2022 without evidence of metastatic disease   Recommend adjuvant chemotherapy with gem cis.  Discussed the category 2B recommendation for nivolumab in adjuvant setting.  Patient would like to think about his options prior to making a decision.   He called our office to let us know that he would like to proceed with adjuvant nivolumab.    Initiated adjuvant nivolumab on 09/21/2022..  Patient missed 1 dose of nivolumab in between given issues with thyroid function  Patient completed adjuvant nivolumab in September 2023  Plan for scans and labs q.3-4 months for the 1st 2 years, q.6 months months thereafter to complete total of 5 years.  Continue follow-up with Dr. Aguilar for hypothyroidism.   Reviewed CT chest abdomen pelvis with contrast from May 2023 without evidence of disease  CT chest abdomen pelvis with contrast in September 2023 without evidence of disease  CT chest abdomen pelvis with contrast in December 2023 without evidence of  disease  CT chest abdomen pelvis with contrast in April 2024 without evidence of disease  CT chest abdomen pelvis with contrast in June 2024 without evidence of disease  CT chest abdomen pelvis with contrast in November 2024 without evidence of disease  CT chest abdomen pelvis with contrast in March 2025 with possible bladder mass versus engorgement of the prostate deforming the floor of the bladder along with a noncalcified nodule in the right upper lobe of the lung.      #Hypothyroidism    - noted very high TSH of 92.6 with free T4 of less than 0.42 while during previous visit was noted to have very low TSH of 0.020 (11/2022) & 0.039 (10/2022). Previously 2.83 (09/2022).  - this could be initially because of the thyroiditis after being started on autoimmune disease and now developed hypothyroidism.  Thus will start the patient on low-dose of levothyroxine for now and referred to endocrinology for further evaluation  - cortisol 6.6 (11/2022)  - Hypothyroidism currently being treated by Dr Aguilar, endocrinologist.       # Lung mass, seen on CT from March 2025, noncalcified, plan for continued monitoring with CT in 4 months    Plan:     Patient was advised to follow-up with urology for possible cystoscopy given findings of a bladder mass on CT from March 2025  Will forward patient's CT from today's visit to his urologist.  Plan to follow-up in 4 months with repeat labs and CT chest abdomen pelvis with contrast for surveillance.      Portions of the record may have been created with voice recognition software. Occasional wrong-word or sound-a-like substitutions may have occurred due to the inherent limitations of voice recognition software.

## 2025-03-18 ENCOUNTER — DOCUMENTATION ONLY (OUTPATIENT)
Dept: FAMILY MEDICINE | Facility: CLINIC | Age: 80
End: 2025-03-18
Payer: MEDICARE

## 2025-03-18 ENCOUNTER — RESULTS FOLLOW-UP (OUTPATIENT)
Dept: PRIMARY CARE CLINIC | Facility: CLINIC | Age: 80
End: 2025-03-18

## 2025-04-14 DIAGNOSIS — I10 HYPERTENSION, UNSPECIFIED TYPE: ICD-10-CM

## 2025-05-21 DIAGNOSIS — E78.5 HYPERLIPIDEMIA, UNSPECIFIED HYPERLIPIDEMIA TYPE: ICD-10-CM

## 2025-05-21 DIAGNOSIS — I10 HYPERTENSION, UNSPECIFIED TYPE: ICD-10-CM

## 2025-05-21 RX ORDER — ATORVASTATIN CALCIUM 20 MG/1
20 TABLET, FILM COATED ORAL DAILY
Qty: 90 TABLET | Refills: 1 | Status: SHIPPED | OUTPATIENT
Start: 2025-05-21

## 2025-05-21 RX ORDER — BENAZEPRIL HYDROCHLORIDE 40 MG/1
40 TABLET ORAL DAILY
Qty: 90 TABLET | Refills: 1 | Status: SHIPPED | OUTPATIENT
Start: 2025-05-21

## 2025-05-21 RX ORDER — BENAZEPRIL HYDROCHLORIDE 40 MG/1
40 TABLET ORAL
Qty: 90 TABLET | Refills: 1 | OUTPATIENT
Start: 2025-05-21

## 2025-07-15 ENCOUNTER — OFFICE VISIT (OUTPATIENT)
Dept: HEMATOLOGY/ONCOLOGY | Facility: CLINIC | Age: 80
End: 2025-07-15
Payer: MEDICARE

## 2025-07-15 VITALS
WEIGHT: 192.81 LBS | BODY MASS INDEX: 29.22 KG/M2 | HEIGHT: 68 IN | TEMPERATURE: 98 F | SYSTOLIC BLOOD PRESSURE: 114 MMHG | HEART RATE: 88 BPM | RESPIRATION RATE: 14 BRPM | OXYGEN SATURATION: 95 % | DIASTOLIC BLOOD PRESSURE: 75 MMHG

## 2025-07-15 DIAGNOSIS — C68.9 UROTHELIAL CARCINOMA: Primary | ICD-10-CM

## 2025-07-15 DIAGNOSIS — E03.9 HYPOTHYROIDISM, UNSPECIFIED TYPE: ICD-10-CM

## 2025-07-15 DIAGNOSIS — C67.9 MALIGNANT NEOPLASM OF URINARY BLADDER, UNSPECIFIED SITE: ICD-10-CM

## 2025-07-15 PROCEDURE — 99214 OFFICE O/P EST MOD 30 MIN: CPT | Mod: ,,, | Performed by: STUDENT IN AN ORGANIZED HEALTH CARE EDUCATION/TRAINING PROGRAM

## 2025-07-15 NOTE — PROGRESS NOTES
Referring provider Dr. Hensley  Reason for consult:  Urothelial carcinoma    Diagnosis:  - Urothelial ca pT3 pNxMX, at least stage III     Treatment History:    Nephro ureterectomy on 23rd July 2022. Refused chemo.   09/21/2022- 09/07/2023: adjuvant Nivolumab    Current Treatment: :  Surveillance      HPI  Patient is a 79-year-old with history of hypertension and BPH who presented to the ER in May 2022 with symptoms of hematuria.  A CT at that point revealed an obstructing mass in the right renal pelvis for which he was seen by Dr. Hensley and underwent a diagnostic ureteroscopy, pathology from which revealed urothelial carcinoma.  Patient underwent a right ureteronephrectomy on 23rd July 2022.  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  Patient presented to clinic to establish care to discuss further disease management.    Patient was started on adjuvant nivolumab on 09/21/2022.    Patient is a current smoker, 25 pack year smoking history, denies alcohol or recreational drug use.  He currently works part-time, [employer].  Denies any family history of malignancy.  Lives with his wife.  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    INTERVAL HISTORY:     Today, 07/15/2025,    Pathology    04/11/2025 bladder Transurethral resection of the tumor:  High-grade papillary urothelial carcinoma, noninvasive, muscularis propria present, negative for tumor.    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  Margins free of tumor, lymphovascular invasion not seen.  No regional lymph nodes submitted.  PT3 pNX pGlow-grade    Imaging     07/09/2025 CT chest abdomen pelvis with contrast stable pulmonary nodular change without detrimental interval  change.  Stable prominent left axillary lymph node.  Status post right nephrectomy without evidence of metastatic disease within the abdomen.  Interval development of bladder wall thickening and bladder serosal fatty infiltration suggest cystitis.  That has minimal gas within the bladder lumen.  Neoplastic process can not be excluded.  Prostate enlargement      03/07/2025 CT chest abdomen pelvis with contrast:  Emphysematous changes present in the lungs with multiple punctate calcified and some noncalcified nodules.  There was 1 noncalcified nodule not seen on previous study seen in the right upper lobe.  Evidence appearance is nonspecific and continued follow-up of this lesion is recommended.  Prominent atherosclerotic changes seen in the aorta, iliac in coronary arteries.  Prior right nephrectomy with no evidence of metastatic disease in the abdomen or pelvis.  Possible bladder mass versus encroachment of the prostate deforming the floor of the bladder.    11/06/24: CT chest abdomen pelvis with contrast: No significant change. No evidence of residual or recurrent tumor. No evidence of metastatic diease to the abdomen or pelvis. Stable indeterminate hypodense lesions in the left kidney most likely cysts. Stable indeterminate hepatic hypodensities most likely cysts.     07/05/2024 CT chest abdomen pelvis with contrast no significant change in the chest since prior study.  No significant change since prior study in the abdomen or pelvis.  No evidence of local recurrence of tumor or metastatic disease.    04/03/24: CT chest abdomen pelvis with contrast:   No acute pulmonary disease, adenopathy or suspicious pulmonary nodules identified in the chest. No findings suspicious for residual or recurrent malignancy identified.      12/29/2023 CT chest abdomen pelvis with contrast:  No acute pulmonary disease, adenopathy or suspicious pulmonary nodules identified in the chest.  Status post right nephrectomy.  No findings  suspicious for residual or recurrent malignancy identified.  Vascular calcifications producing stenosis in the left common iliac artery and external iliac arteries bilaterally.  Incidental small fat containing left inguinal hernia.    09/29/2023 CT chest abdomen pelvis with contrast:  Development of nonspecific mild left axillary lymphadenopathy.  Measuring up to 1.2 cm stable nonspecific bilateral pulmonary nodules, most of which are calcified, most likely secondary to old granulomatous disease.  No other significant change in the chest.  No significant change in the abdomen.  Status post right nephrectomy with no evidence of residual or recurrent tumor.  No evidence of metastatic disease to the abdomen or pelvis.  Stable indeterminate hepatic and left renal hypodensities most likely cyst or possible hemangiomas within the liver.    May 2022:  CT abdomen pelvis with contrast:  The filling defect in the right renal pelvis is most suggestive of a mass with neoplasm a concern.  This lesion also produces relatively delay in contrast opacification of the right kidney indicating partial urinary obstruction.    09/06/2022 CT chest abdomen pelvis with IV contrast:  No acute pulmonary disease, adenopathy or suspicious pulmonary nodules identified.  Is suspected cyst is visible in the right thyroid lobe, evaluation with dedicated ultrasound to be considered.  Status post right nephrectomy and apparent surgery involving the right lateral wall of the urinary bladder, no gross mucosal lesions visible in the bladder although there is relative wall thickening identified inferiorly and posteriorly.    05/16/2023 CT chest abdomen pelvis with contrast:  No evidence of neoplasm in the chest, small pericardial effusion stable.  Tiny hepatic cysts, prior right nephrectomy, atherosclerotic and degenerative changes, small umbilical hernia      LAB:     07/08/2025 creatinine 1.32, LFTs unremarkable, WBC count 9.1, hemoglobin 15.1, MCV  104, platelet count 188, ANC 6.24        03/03/2025 creatinine 1.6, LFTs unremarkable, WBC count 8.9, hemoglobin 16.1, .8, platelet count 186, ANC 5.8    11/04/24: cr 1.62, alb 3.8, ca 9.75, LFTs WNL, K+ 5.16, wbc 9.31, hgb 14.8, plt 193, ANC 5.88, tsh 1.565  07/01/2024 creatinine 1.58, albumin 3.8, LFTs unremarkable, TSH 0.2, WBC count 9.9, hemoglobin 13.8, .9, platelet count 231, ANC 6.48        04/02/24: cr 1.49, alb 3.8, ca 9.52, LFTs WNL, TSH 0.201, wbc 9.07, hgb 15.3, plt 191, ANC 5.41  01/03/2024:  Creatinine 1.69, albumin 3.9, calcium 9.37, LFTs unremarkable, TSH 0.47, Free T4 1.27, WBC count 9.6, hemoglobin 14.5, .9, platelet count 199, ANC 6.0.    10/01/2023:  Creatinine 1.54, albumin 3.9, TSH 0.1778, cortisol 9.5, WBC count 10.5, hemoglobin 14.5, .5, platelet count 190, ANC 7.29.    09/06/2023: TSH 0.1245, alb 3.8, ca 9.49, LFTs WNL, cortisol 8.4, mag 2.0, phosphorus 2.8, wbc 10.56, hgb 14.4, plt 204, ANC 7.32  08/09/2023: TSH 0.2418, alb 4.0, ca 9.48, LFTs WNL, cortisol 8.1, wbc 8.89, hgb 14.8, plt 209, ANC 5.54  06/14/2023:  TSH 0.6, albumin 3.9, calcium 9.8, LFTs within normal limits, cortisol 8.9, creatinine 1.54, WBC count 9.01, hemoglobin 15.1, .3, platelet count 187, ANC 6.12.  05/17/2023:  TSH 0.6, free T4 1.2, albumin 3.9, calcium 9.4, LFTs within normal limits, cortisol 12, creatinine 1.48, WBC count 8.01, hemoglobin 14.9, .7, platelet count 188, ANC 4.91.  04/13/23: TSH 5.1882, T4 1.24, cr 1.56, alb 3.9, ca 9.53, LFTs WNL, wbc 9.75, hgb 14.8, plt 197, ANC 6.53  03/16/2023:  Hemoglobin 15.3, hematocrit 47%, WBC 10k platelets 173 K.  BUN 27 creatinine 1.84.  TSH is still very high 89 uI/ml  03.08/23: Cr 1.93, ca 9.52, alb 4.1, LFTs WNL, cortisol 8.6, TSH 79.73 T4 .50, wbc 9.08, hgb 14.9, plt 185, ANC 6.04  02/08/2023:  Creatinine 1.8, albumin 4.2, calcium 9.4, total bili 0.9, AST 18, ALT 15, TSH 25, cortisol 9.6, WBC 9.3, hemoglobin 14.8, .1,  platelet count 192.  ANC 6.3.  Magnesium 2.1, phosphorus 3.3.  01/11/23: Cr 1.70, K+ 5.23, mag 2.1, phosphorous 4.7, LFTs WNL, TSH 98.9, Free T4 0.53, wbc 10.84, hgb 14.6, plt 190, ANC 7.48  12/14/2022-potassium 5.3, creatinine 1.74 WBC 10.05, hemoglobin 14.6, platelets 188, TSH 92.608, free T4 less than 0.42  11/16/2022:  Creatinine 1.6, albumin 3.8, calcium 9.5, alkaline phosphatase 100, total bili 0.4, AST 16, ALT 20, TSH 0.020, free T4 pending, WBC count 9.6, hemoglobin 13.7, .5, platelet count 202, ANC 6.36.     10/19/2022:  Creatinine 1.64, albumin 3.8, alkaline phosphatase 110, LFTs within normal limits, TSH 0.039, cortisol 4.9, WBC count 7.6, hemoglobin 13.6, .3, platelet count 191 000, ANC 4.75.    09/02/2022:  Creatinine 1.44, albumin 4.0, alkaline phosphatase 98, total bili 0.7, AST 18, ALT 18, WBC count 9.06, hemoglobin 14.5, platelet count 173.  ANC 5.70.     Past Medical History:   Diagnosis Date    Bladder cancer 2004    tumor removed    BPH (benign prostatic hyperplasia)     Carotid arterial disease     Carotid bruit     COPD (chronic obstructive pulmonary disease)     Enlarged prostate     Gynecomastia     Hyperlipidemia     Hypertension     Kidney stones     Mixed hyperlipidemia     Smoker     Urothelial carcinoma of kidney, right     Vitamin D deficiency        Past Surgical History:   Procedure Laterality Date    KNEE SURGERY      LAPAROSCOPIC ROBOT-ASSISTED SURGICAL REMOVAL OF KIDNEY USING DA JAZMIN XI  07/20/2022    SHOULDER SURGERY      STENT, CAROTID W/O PROTECT      TURBT (TRANSURETHRAL RESECTION OF BLADDER TUMOR)         Family History   Problem Relation Name Age of Onset    Heart disease Mother      Heart disease Father      Emphysema Father      Heart disease Sister      Diabetes Paternal Grandmother         Social History     Socioeconomic History    Marital status:     Number of children: 2   Tobacco Use    Smoking status: Every Day     Current packs/day: 1.00      Types: Cigarettes    Smokeless tobacco: Never   Substance and Sexual Activity    Alcohol use: Never    Drug use: Never    Sexual activity: Not Currently     Social Drivers of Health     Financial Resource Strain: Low Risk  (3/5/2025)    Overall Financial Resource Strain (CARDIA)     Difficulty of Paying Living Expenses: Not hard at all   Food Insecurity: No Food Insecurity (3/5/2025)    Hunger Vital Sign     Worried About Running Out of Food in the Last Year: Never true     Ran Out of Food in the Last Year: Never true   Transportation Needs: No Transportation Needs (3/5/2025)    PRAPARE - Transportation     Lack of Transportation (Medical): No     Lack of Transportation (Non-Medical): No   Physical Activity: Insufficiently Active (3/5/2025)    Exercise Vital Sign     Days of Exercise per Week: 1 day     Minutes of Exercise per Session: 20 min   Stress: No Stress Concern Present (3/5/2025)    [employer] - Occupational Stress Questionnaire     Feeling of Stress : Not at all   Housing Stability: Low Risk  (3/5/2025)    Housing Stability Vital Sign     Unable to Pay for Housing in the Last Year: No     Homeless in the Last Year: No       Current Outpatient Medications   Medication Sig Dispense Refill    albuterol (PROVENTIL) 2.5 mg /3 mL (0.083 %) nebulizer solution Take 2.5 mg by nebulization 4 (four) times daily as needed (up to 4 times daily as neeeded for couch, wheezing or for shortness of breath). Rescue      aspirin (ECOTRIN) 81 MG EC tablet Take 81 mg by mouth once daily.      atorvastatin (LIPITOR) 20 MG tablet Take 1 tablet (20 mg total) by mouth once daily. 90 tablet 1    benazepriL (LOTENSIN) 40 MG tablet Take 1 tablet (40 mg total) by mouth once daily. 90 tablet 1    diclofenac sodium (VOLTAREN) 1 % Gel Apply topically 4 (four) times daily. 100 g 0    ezetimibe (ZETIA) 10 mg tablet Take 1 tablet (10 mg total) by mouth once daily. 90 tablet 3    finasteride (PROSCAR) 5 mg tablet  Take 5 mg by mouth once daily.      levothyroxine (SYNTHROID) 100 MCG tablet Take 100 mcg by mouth.      levothyroxine (SYNTHROID) 112 MCG tablet Take 100 mcg by mouth before breakfast.      multivitamin (THERAGRAN) per tablet Take 1 tablet by mouth once daily.      sodium zirconium cyclosilicate (LOKELMA) 10 gram packet Take 10 g by mouth every other day. Mix entire contents of packet(s) into drinking glass containing 3 tablespoons of water; stir well and drink immediately. Add water and repeat until no powder remains to receive entire dose.      tamsulosin (FLOMAX) 0.4 mg Cap Take 1 capsule (0.4 mg total) by mouth once daily. 90 capsule 0     No current facility-administered medications for this visit.       Review of patient's allergies indicates:   Allergen Reactions    Pravachol [pravastatin] Other (See Comments)     Muscle aches        Review of systems     CONSTITUTIONAL: no fevers, no chills, no weight loss, no fatigue, no weakness  HEMATOLOGIC: no abnormal bleeding, no abnormal bruising, no drenching night sweats  ONCOLOGIC: no new masses or lumps  HEENT: no vision loss, no tinnitus or hearing loss, no nose bleeding, no dysphagia, no odynophagia  CVS: no chest pain, no palpitations, no dyspnea on exertion  RESP: no shortness of breath, no hemoptysis, no cough  GI: no nausea, no vomiting, no diarrhea, no constipation, no melena, no hematochezia, no hematemesis, no abdominal pain, no increase in abdominal girth  : no dysuria, no hematuria, no hesitancy, no scrotal swelling, no discharge  INTEGUMENT: no rashes, no abnormal bruising, no nail pitting, no hyperpigmentation  NEURO: no falls, no memory loss, no paresthesias or dysesthesias, no urofecal incontinence or retention, no loss of strength on any extremity  MSK: no back pain, no new joint pain, no joint swelling  PSYCH: no suicidal or homicidal ideation, no depression, no insomnia, no anhedonia  ENDOCRINE: no heat or cold intolerance, no polyuria, no  polydipsia       Physical exam     Vitals:    07/15/25 1359   BP: 114/75   Pulse: 88   Resp: 14   Temp: 98.2 °F (36.8 °C)           ECOG PS 0  GA: AAOx3, NAD  HEENT: NCAT, PERRLA, EOMI, good dentition, moist oral mucous membranes  LYMPH: no cervical, axillary or supraclavicular adenopathy  CVS: s1s2 RRR, no M/R/G  RESP: CTA b/l, no crackles, no wheezes or rhonchi  ABD: soft, NT, ND, BS+, no hepatosplenomegaly  EXT: no deformities, no pedal edema  SKIN: no rashes, warm and dry  NEURO: normal mentation, strength 5/5 on all 4 extremities, no sensory deficits      Assessment:       # Bladder papillary urothelial carcinoma, noninvasive  Diagnosed on biopsy done in April 2025, muscularis propria present, negative for tumor.  Status post intravesical BCG with Urology per patient report      # Urothelial carcinoma, right, upper ureter, low-grade, pT3 pNxMX, at least stage III  Negative margins, negative for lymphovascular invasion.    Of note patient had a diagnosis of non-muscle invasive bladder cancer in the past treated with resection followed by intravesical therapy.  Discussed with patient the diagnosis, staging and treatment recommendations including adjuvant therapy in the setting of T3 disease.    Patient is a relatively fit, currently works part-time.  He does have mild hearing loss.  Denies any symptoms of neuropathy.    Discussed the recommendations to undergo adjuvant platinum-based chemotherapy given T3 disease after completion of staging workup.     Patient is hesitant to consider chemotherapy given his experience with his family members going through it.    Noted repeat CT chest abdomen pelvis IV contrast on 09/06/2022 without evidence of metastatic disease   Recommend adjuvant chemotherapy with gem cis.  Discussed the category 2B recommendation for nivolumab in adjuvant setting.  Patient would like to think about his options prior to making a decision.   He called our office to let us know that he would like  to proceed with adjuvant nivolumab.    Initiated adjuvant nivolumab on 09/21/2022..  Patient missed 1 dose of nivolumab in between given issues with thyroid function  Patient completed adjuvant nivolumab in September 2023  Plan for scans and labs q.3-4 months for the 1st 2 years, q.6 months months thereafter to complete total of 5 years.  Continue follow-up with Dr. Aguilar for hypothyroidism.   Reviewed CT chest abdomen pelvis with contrast from May 2023 without evidence of disease  CT chest abdomen pelvis with contrast in September 2023 without evidence of disease  CT chest abdomen pelvis with contrast in December 2023 without evidence of disease  CT chest abdomen pelvis with contrast in April 2024 without evidence of disease  CT chest abdomen pelvis with contrast in June 2024 without evidence of disease  CT chest abdomen pelvis with contrast in November 2024 without evidence of disease  CT chest abdomen pelvis with contrast in March 2025 with possible bladder mass versus engorgement of the prostate deforming the floor of the bladder along with a noncalcified nodule in the right upper lobe of the lung.    CT chest abdomen pelvis with contrast in July 2025 with resolution of the right upper lobe nodule however persistent wall thickening concerning for underlying malignancy.      #Hypothyroidism    - noted very high TSH of 92.6 with free T4 of less than 0.42 while during previous visit was noted to have very low TSH of 0.020 (11/2022) & 0.039 (10/2022). Previously 2.83 (09/2022).  - this could be initially because of the thyroiditis after being started on autoimmune disease and now developed hypothyroidism.  Thus will start the patient on low-dose of levothyroxine for now and referred to endocrinology for further evaluation  - cortisol 6.6 (11/2022)  - Hypothyroidism currently being treated by Dr Aguilar, endocrinologist.       # Lung mass, seen on CT from March 2025, noncalcified, plan for continued monitoring with  CT in 4 months  Follow-up CT in July 2025 with near complete resolution of the right upper lobe subpleural nodule     Plan:     Patient was advised to follow-up with urology as scheduled for findings of noninvasive papillary cell carcinoma of the bladder on resection from April 2025 and persistent bladder wall thickening on CT from July 2025   Will forward patient's CT from today's visit to his urologist.  Plan to follow-up in 4 months with repeat labs and CT chest abdomen pelvis with contrast for surveillance.      Portions of the record may have been created with voice recognition software. Occasional wrong-word or sound-a-like substitutions may have occurred due to the inherent limitations of voice recognition software.